# Patient Record
Sex: FEMALE | Race: ASIAN | Employment: OTHER | URBAN - METROPOLITAN AREA
[De-identification: names, ages, dates, MRNs, and addresses within clinical notes are randomized per-mention and may not be internally consistent; named-entity substitution may affect disease eponyms.]

---

## 2017-05-10 ENCOUNTER — OFFICE VISIT (OUTPATIENT)
Dept: FAMILY MEDICINE CLINIC | Age: 62
End: 2017-05-10

## 2017-05-10 ENCOUNTER — HOSPITAL ENCOUNTER (OUTPATIENT)
Dept: GENERAL RADIOLOGY | Age: 62
Discharge: HOME | End: 2017-05-10
Payer: SUBSIDIZED

## 2017-05-10 VITALS
BODY MASS INDEX: 24.88 KG/M2 | SYSTOLIC BLOOD PRESSURE: 145 MMHG | WEIGHT: 133 LBS | DIASTOLIC BLOOD PRESSURE: 95 MMHG | HEART RATE: 71 BPM | TEMPERATURE: 98.8 F

## 2017-05-10 DIAGNOSIS — M25.512 ACUTE PAIN OF LEFT SHOULDER: Primary | ICD-10-CM

## 2017-05-10 DIAGNOSIS — M25.512 ACUTE PAIN OF LEFT SHOULDER: ICD-10-CM

## 2017-05-10 DIAGNOSIS — I10 ESSENTIAL HYPERTENSION: ICD-10-CM

## 2017-05-10 PROCEDURE — 73030 X-RAY EXAM OF SHOULDER: CPT

## 2017-05-10 RX ORDER — LISINOPRIL 5 MG/1
5 TABLET ORAL DAILY
Qty: 90 TAB | Refills: 3 | Status: SHIPPED | OUTPATIENT
Start: 2017-05-10 | End: 2017-12-06 | Stop reason: SDUPTHER

## 2017-05-10 RX ORDER — ISOSORBIDE MONONITRATE 30 MG/1
30 TABLET, EXTENDED RELEASE ORAL DAILY
Qty: 90 TAB | Refills: 1 | Status: SHIPPED | OUTPATIENT
Start: 2017-05-10 | End: 2017-09-13 | Stop reason: SDUPTHER

## 2017-05-10 RX ORDER — METHOCARBAMOL 500 MG/1
500 TABLET, FILM COATED ORAL 3 TIMES DAILY
Qty: 12 TAB | Refills: 0 | Status: SHIPPED | OUTPATIENT
Start: 2017-05-10 | End: 2017-09-13 | Stop reason: SDUPTHER

## 2017-05-10 RX ORDER — DICLOFENAC SODIUM 75 MG/1
75 TABLET, DELAYED RELEASE ORAL 2 TIMES DAILY
Qty: 60 TAB | Refills: 0 | Status: SHIPPED | OUTPATIENT
Start: 2017-05-10 | End: 2017-09-13 | Stop reason: SDUPTHER

## 2017-05-10 NOTE — PROGRESS NOTES
Assessment/Plan:    Indy Newsome was seen today for hypertension and arm pain. Diagnoses and all orders for this visit:    Acute pain of left shoulder  -     diclofenac EC (VOLTAREN) 75 mg EC tablet; Take 1 Tab by mouth two (2) times a day. -     XR SHOULDER LT AP/LAT MIN 2 V; Future  -     methocarbamol (ROBAXIN) 500 mg tablet; Take 1 Tab by mouth three (3) times daily. May take at bedtime for shoulder pain    Essential hypertension  -     isosorbide mononitrate ER (IMDUR) 30 mg tablet; Take 1 Tab by mouth daily. -     lisinopril (PRINIVIL, ZESTRIL) 5 mg tablet; Take 1 Tab by mouth daily. Follow-up Disposition:  Return in about 3 months (around 8/10/2017), or if symptoms worsen or fail to improve. CHAIM De expressed understanding of this plan. An AVS was printed and given to the patient.      ----------------------------------------------------------------------    Chief Complaint   Patient presents with    Hypertension     Medicatin refill    Arm Pain     Left arm pain  X 3 weeks       History of Present Illness:    3 weeks of left shoulder pain. 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 She has not been on any medication for this problem. She has not had any fever or swelling or redness. She reports that it is hard to sleep on her arm due to the pain. She denies radiation of the pain. She has no pain with upper arm reach but pain is reproduced with resisted forward movement. She has not run out of her bp medication. No chest pain or SOB mentioned    Past Medical History:   Diagnosis Date    Essential hypertension        Current Outpatient Prescriptions   Medication Sig Dispense Refill    isosorbide mononitrate ER (IMDUR) 30 mg tablet Take 1 Tab by mouth daily. 90 Tab 1    lisinopril (PRINIVIL, ZESTRIL) 5 mg tablet Take 1 Tab by mouth daily.  90 Tab 3    diclofenac EC (VOLTAREN) 75 mg EC tablet Take 1 Tab by mouth two (2) times a day. 60 Tab 0    methocarbamol (ROBAXIN) 500 mg tablet Take 1 Tab by mouth three (3) times daily. May take at bedtime for shoulder pain 12 Tab 0    raNITIdine (ZANTAC) 150 mg tablet Take 1 Tab by mouth two (2) times a day. 60 Tab 3    guaiFENesin SR (MUCINEX) 600 mg SR tablet Take 1 Tab by mouth two (2) times a day. 60 Tab 1       No Known Allergies    Social History   Substance Use Topics    Smoking status: Former Smoker    Smokeless tobacco: None    Alcohol use No       Family History   Problem Relation Age of Onset    Cancer Sister      breast    Breast Cancer Sister 76       Physical Exam:     Visit Vitals    BP (!) 145/95 (BP 1 Location: Left arm, BP Patient Position: Sitting)    Pulse 71    Temp 98.8 °F (37.1 °C) (Oral)    Wt 133 lb (60.3 kg)    BMI 24.88 kg/m2     gen looks well, pleasant  A&Ox3  WDWN NAD  Respirations normal and non labored  LUE examined: no deformity noted on exam. She has tenderness at lateral scapular border left side and at posterior shoulder joint. She has FROM upward reach. She has no pain with lateral movement. The only movement that reproduces her pain is forward resisted reach.  Hand and elbow have FROM, no swelling, neuro is intact distal to fingertips

## 2017-05-10 NOTE — PATIENT INSTRUCTIONS
Shoulder Pain: Care Instructions  Your Care Instructions    You can hurt your shoulder by using it too much during an activity, such as fishing or baseball. It can also happen as part of the everyday wear and tear of getting older. Shoulder injuries can be slow to heal, but your shoulder should get better with time. Your doctor may recommend a sling to rest your shoulder. If you have injured your shoulder, you may need testing and treatment. Follow-up care is a key part of your treatment and safety. Be sure to make and go to all appointments, and call your doctor if you are having problems. It's also a good idea to know your test results and keep a list of the medicines you take. How can you care for yourself at home? · Take pain medicines exactly as directed. ¨ If the doctor gave you a prescription medicine for pain, take it as prescribed. ¨ If you are not taking a prescription pain medicine, ask your doctor if you can take an over-the-counter medicine. ¨ Do not take two or more pain medicines at the same time unless the doctor told you to. Many pain medicines contain acetaminophen, which is Tylenol. Too much acetaminophen (Tylenol) can be harmful. · If your doctor recommends that you wear a sling, use it as directed. Do not take it off before your doctor tells you to. · Put ice or a cold pack on the sore area for 10 to 20 minutes at a time. Put a thin cloth between the ice and your skin. · If there is no swelling, you can put moist heat, a heating pad, or a warm cloth on your shoulder. Some doctors suggest alternating between hot and cold. · Rest your shoulder for a few days. If your doctor recommends it, you can then begin gentle exercise of the shoulder, but do not lift anything heavy. When should you call for help? Call 911 anytime you think you may need emergency care. For example, call if:  · You have chest pain or pressure. This may occur with:  ¨ Sweating. ¨ Shortness of breath.   ¨ Nausea or vomiting. ¨ Pain that spreads from the chest to the neck, jaw, or one or both shoulders or arms. ¨ Dizziness or lightheadedness. ¨ A fast or uneven pulse. After calling 911, chew 1 adult-strength aspirin. Wait for an ambulance. Do not try to drive yourself. · Your arm or hand is cool or pale or changes color. Call your doctor now or seek immediate medical care if:  · You have signs of infection, such as:  ¨ Increased pain, swelling, warmth, or redness in your shoulder. ¨ Red streaks leading from a place on your shoulder. ¨ Pus draining from an area of your shoulder. ¨ Swollen lymph nodes in your neck, armpits, or groin. ¨ A fever. Watch closely for changes in your health, and be sure to contact your doctor if:  · You cannot use your shoulder. · Your shoulder does not get better as expected. Where can you learn more? Go to http://malachi-dustin.info/. Enter B485 in the search box to learn more about \"Shoulder Pain: Care Instructions. \"  Current as of: May 23, 2016  Content Version: 11.2  © 9231-5773 [employer]. Care instructions adapted under license by [employer] (which disclaims liability or warranty for this information). If you have questions about a medical condition or this instruction, always ask your healthcare professional. [place] [address] any warranty or liability for your use of this information.

## 2017-05-10 NOTE — MR AVS SNAPSHOT
Visit Information Date & Time Provider Department Dept. Phone Encounter #  
 5/10/2017  8:50 AM Luca Gomez [address], [address] [phone] [id number] Follow-up Instructions Return in about 3 months (around 8/10/2017), or if symptoms worsen or fail to improve. Upcoming Health Maintenance Date Due DTaP/Tdap/Td series (1 - Tdap) 10/18/1976 ZOSTER VACCINE AGE 60> 10/18/2015 FOBT Q 1 YEAR AGE 50-75 7/11/2017 INFLUENZA AGE 9 TO ADULT 8/1/2017 PAP AKA CERVICAL CYTOLOGY 2/4/2018 BREAST CANCER SCRN MAMMOGRAM 9/7/2018 Allergies as of 5/10/2017  Review Complete On: 5/10/2017 By: Kunal Sosa No Known Allergies Current Immunizations  Reviewed on 10/22/2014 Name Date Influenza Vaccine (Quad) PF 11/9/2016, 10/22/2014 Not reviewed this visit You Were Diagnosed With   
  
 Codes Comments Acute pain of left shoulder    -  Primary ICD-10-CM: M25.512 ICD-9-CM: 719.41 Essential hypertension     ICD-10-CM: I10 
ICD-9-CM: 401.9 Vitals BP Pulse Temp Weight(growth percentile) BMI OB Status (!) 145/95 (BP 1 Location: Left arm, BP Patient Position: Sitting) 71 98.8 °F (37.1 °C) (Oral) 133 lb (60.3 kg) 24.88 kg/m2 Postmenopausal  
 Smoking Status Former Smoker Vitals History BMI and BSA Data Body Mass Index Body Surface Area  
 24.88 kg/m 2 1.61 m 2 Preferred Pharmacy Pharmacy Name Phone [facility] [address] [place] [address] [phone] Your Updated Medication List  
  
   
This list is accurate as of: 5/10/17  9:14 AM.  Always use your most recent med list.  
  
  
  
  
 diclofenac EC 75 mg EC tablet Commonly known as:  VOLTAREN Take 1 Tab by mouth two (2) times a day. guaiFENesin  mg SR tablet Commonly known as:  [place] [address] Take 1 Tab by mouth two (2) times a day. isosorbide mononitrate ER 30 mg tablet Commonly known as:  IMDUR Take 1 Tab by mouth daily. lisinopril 5 mg tablet Commonly known as:  Minus Salk Take 1 Tab by mouth daily. methocarbamol 500 mg tablet Commonly known as:  ROBAXIN Take 1 Tab by mouth three (3) times daily. May take at bedtime for shoulder pain  
  
 raNITIdine 150 mg tablet Commonly known as:  ZANTAC Take 1 Tab by mouth two (2) times a day. Prescriptions Sent to Pharmacy Refills  
 isosorbide mononitrate ER (IMDUR) 30 mg tablet 1 Sig: Take 1 Tab by mouth daily. Class: Normal  
 Pharmacy: [address] Ph #: [phone] Route: Oral  
 lisinopril (PRINIVIL, ZESTRIL) 5 mg tablet 3 Sig: Take 1 Tab by mouth daily. Class: Normal  
 Pharmacy: [address] Ph #: [phone] Route: Oral  
 diclofenac EC (VOLTAREN) 75 mg EC tablet 0 Sig: Take 1 Tab by mouth two (2) times a day. Class: Normal  
 Pharmacy: [address] Ph #: [phone] Route: Oral  
 methocarbamol (ROBAXIN) 500 mg tablet 0 Sig: Take 1 Tab by mouth three (3) times daily. May take at bedtime for shoulder pain  
 Class: Normal  
 Pharmacy: [address] Ph #: [phone] Route: Oral  
  
Follow-up Instructions Return in about 3 months (around 8/10/2017), or if symptoms worsen or fail to improve. To-Do List   
 05/10/2017 Imaging:  XR SHOULDER LT AP/LAT MIN 2 V Patient Instructions Shoulder Pain: Care Instructions Your Care Instructions You can hurt your shoulder by using it too much during an activity, such as fishing or baseball. It can also happen as part of the everyday wear and tear of getting older. Shoulder injuries can be slow to heal, but your shoulder should get better with time. Your doctor may recommend a sling to rest your shoulder. If you have injured your shoulder, you may need testing and treatment. Follow-up care is a key part of your treatment and safety. Be sure to make and go to all appointments, and call your doctor if you are having problems. It's also a good idea to know your test results and keep a list of the medicines you take. How can you care for yourself at home? · Take pain medicines exactly as directed. ¨ If the doctor gave you a prescription medicine for pain, take it as prescribed. ¨ If you are not taking a prescription pain medicine, ask your doctor if you can take an over-the-counter medicine. ¨ Do not take two or more pain medicines at the same time unless the doctor told you to. Many pain medicines contain acetaminophen, which is Tylenol. Too much acetaminophen (Tylenol) can be harmful. · If your doctor recommends that you wear a sling, use it as directed. Do not take it off before your doctor tells you to. · Put ice or a cold pack on the sore area for 10 to 20 minutes at a time. Put a thin cloth between the ice and your skin. · If there is no swelling, you can put moist heat, a heating pad, or a warm cloth on your shoulder. Some doctors suggest alternating between hot and cold. · Rest your shoulder for a few days. If your doctor recommends it, you can then begin gentle exercise of the shoulder, but do not lift anything heavy. When should you call for help? Call 911 anytime you think you may need emergency care. For example, call if: 
· You have chest pain or pressure. This may occur with: ¨ Sweating. ¨ Shortness of breath. ¨ Nausea or vomiting. ¨ Pain that spreads from the chest to the neck, jaw, or one or both shoulders or arms. ¨ Dizziness or lightheadedness. ¨ A fast or uneven pulse. After calling 911, chew 1 adult-strength aspirin. Wait for an ambulance. Do not try to drive yourself. · Your arm or hand is cool or pale or changes color. Call your doctor now or seek immediate medical care if: 
· You have signs of infection, such as: 
¨ Increased pain, swelling, warmth, or redness in your shoulder. ¨ Red streaks leading from a place on your shoulder. ¨ Pus draining from an area of your shoulder. ¨ Swollen lymph nodes in your neck, armpits, or groin. ¨ A fever. Watch closely for changes in your health, and be sure to contact your doctor if: 
· You cannot use your shoulder. · Your shoulder does not get better as expected. Where can you learn more? Go to http://malachi-dustin.info/. Enter C530 in the search box to learn more about \"Shoulder Pain: Care Instructions. \" Current as of: May 23, 2016 Content Version: 11.2 © 8821-3297 [employer]. Care instructions adapted under license by [employer] (which disclaims liability or warranty for this information). If you have questions about a medical condition or this instruction, always ask your healthcare professional. [place] [address] any warranty or liability for your use of this information. Introducing \[facility]\"" & HEALTH SERVICES! Dear Sushma Go: Thank you for requesting a Pin-[employer] account. Our records indicate that you already have an active Pin-Digital account. You can access your account anytime at [email or web address] Did you know that you can access your hospital and ER discharge instructions at any time in Pin-Digital? You can also review all of your test results from your hospital stay or ER visit. Additional Information If you have questions, please visit the Frequently Asked Questions section of the Pin-Digital website at [email or web address] Remember, Pin-Digital is NOT to be used for urgent needs. For medical emergencies, dial 911. Now available from your iPhone and Android! Please provide this summary of care documentation to your next provider. Your primary care clinician is listed as [facility]. If you have any questions after today's visit, please call [phone].

## 2017-09-06 ENCOUNTER — HOSPITAL ENCOUNTER (OUTPATIENT)
Dept: MAMMOGRAPHY | Age: 62
Discharge: HOME | End: 2017-09-06

## 2017-09-06 ENCOUNTER — OFFICE VISIT (OUTPATIENT)
Dept: FAMILY PLANNING/WOMEN'S HEALTH CLINIC | Age: 62
End: 2017-09-06

## 2017-09-06 VITALS — DIASTOLIC BLOOD PRESSURE: 82 MMHG | HEART RATE: 78 BPM | SYSTOLIC BLOOD PRESSURE: 120 MMHG

## 2017-09-06 DIAGNOSIS — Z12.31 VISIT FOR SCREENING MAMMOGRAM: ICD-10-CM

## 2017-09-06 DIAGNOSIS — Z01.419 ENCOUNTER FOR WELL WOMAN EXAM: Primary | ICD-10-CM

## 2017-09-06 PROCEDURE — 77067 SCR MAMMO BI INCL CAD: CPT

## 2017-09-06 NOTE — PROGRESS NOTES
EVERY WOMANS LIFE HISTORY QUESTIONNAIRE       No Yes Comments   Has a doctor ever seen or felt anything wrong with your breast? [x]                                  []                                     Have you ever had a breast biopsy? [x]                                  []                                          When and where was last mammogram performed? 9/2016 [facility]    Have you ever been told that there was a problem on your mammogram?   No Yes Comments   [x]                                  []                                       Do you have breast implants? No Yes Comments   [x]                                  []                                       When was your last Pap test performed? 2/2015 [facility]    Have you ever had an abnormal Pap test?   No Yes Comments   [x]                                  []                                       Have you had a hysterectomy? No Yes Comments (why)   [x]                                  []                                       Have you ever been diagnosed with any type of Cancer   No Yes Comments (type,when,where,type of treatment   [x]                                  []                                          Has a family member been diagnosed with breast or ovarian cancer? No Yes Comments (which family members, and type   []                                  [x]                                  Sister diagnosed with breast cancer at 79, still living      Did your mother take CLAUDIA? No Yes Unknown   [x]                                  []                                       Do you have a history of HIV exposure? No Yes    [x]                                  []                                         Have you been through menopause?    No Yes Date of LMP   []                                  [x]                                  2003     Are you taking hormone replacement therapy (HRT)     No Yes Comments   []                                  [x] Took for 5 years, no longer on     How many times have you been pregnant? 7          Number of live births ? 6    Are you experiencing any of the following? No Yes Comments   Nipple Discharge [x]                                  []                                     Breast Lump/Masses [x]                                  []                                     Breast Skin Changes [x]                                  []                                          No Yes Comments   Vaginal Discharge [x]                                  []                                     Abnormal/unusual vaginal bleeding [x]                                  []                                         Are you experiencing any other health problems?  Blood pressure, joint problems

## 2017-09-06 NOTE — PROGRESS NOTES
Assessment/Plan:    Diagnoses and all orders for this visit:    1. Encounter for well woman exam    Has f/up  for shoulder pain. Follow-up Disposition:  Return in about 1 year (around 2018). CHAIM Strickland expressed understanding of this plan. An AVS was printed and given to the patient.      ----------------------------------------------------------------------    Chief Complaint   Patient presents with    Well Woman     EWL       History of Present Illness:  63 yo  menopause     21 years ago, not sexually active  Had hot flashes initially after menopause and doctor in [facility] rx'd HRT for 5 years, since then no HRT and no hot flashes  No hx of abnl pap or mammo  She had a colonoscopy  which was negative with a 10 year f/up recommended- see note please      Past Medical History:   Diagnosis Date    Essential hypertension        Current Outpatient Prescriptions   Medication Sig Dispense Refill    isosorbide mononitrate ER (IMDUR) 30 mg tablet Take 1 Tab by mouth daily. 90 Tab 1    lisinopril (PRINIVIL, ZESTRIL) 5 mg tablet Take 1 Tab by mouth daily. 90 Tab 3    diclofenac EC (VOLTAREN) 75 mg EC tablet Take 1 Tab by mouth two (2) times a day. 60 Tab 0    methocarbamol (ROBAXIN) 500 mg tablet Take 1 Tab by mouth three (3) times daily. May take at bedtime for shoulder pain 12 Tab 0    raNITIdine (ZANTAC) 150 mg tablet Take 1 Tab by mouth two (2) times a day. 60 Tab 3    guaiFENesin SR (MUCINEX) 600 mg SR tablet Take 1 Tab by mouth two (2) times a day.  60 Tab 1       No Known Allergies    Social History   Substance Use Topics    Smoking status: Former Smoker    Smokeless tobacco: None    Alcohol use No       Family History   Problem Relation Age of Onset    Cancer Sister      breast    Breast Cancer Sister 76       Physical Exam:     Visit Vitals    /82 (BP 1 Location: Left arm, BP Patient Position: Sitting)    Pulse 78 A&Ox3  WDWN NAD  Respirations normal and non labored  Breast exam- neg doris for masses, dimpling, retractions, skin color changes or nipple changes  Pelvic exam- ext neg for lesions or masses.  Severe atrophy/vaginal dryness makes for uncomfortable exam. Cervix is posterior and was able to feel it with bimanual exam but did not visualize it  Uterus and adnexal exam show no mass or tenderness  Rectal exam- no masses felt, heme neg stool guaiac

## 2017-09-13 ENCOUNTER — OFFICE VISIT (OUTPATIENT)
Dept: FAMILY MEDICINE CLINIC | Age: 62
End: 2017-09-13

## 2017-09-13 VITALS
SYSTOLIC BLOOD PRESSURE: 136 MMHG | TEMPERATURE: 97.6 F | DIASTOLIC BLOOD PRESSURE: 93 MMHG | BODY MASS INDEX: 24.84 KG/M2 | HEIGHT: 61 IN | HEART RATE: 77 BPM | WEIGHT: 131.6 LBS

## 2017-09-13 DIAGNOSIS — I10 ESSENTIAL HYPERTENSION: ICD-10-CM

## 2017-09-13 DIAGNOSIS — G89.29 CHRONIC LEFT SHOULDER PAIN: Primary | ICD-10-CM

## 2017-09-13 DIAGNOSIS — M25.512 CHRONIC LEFT SHOULDER PAIN: Primary | ICD-10-CM

## 2017-09-13 DIAGNOSIS — M25.512 ACUTE PAIN OF LEFT SHOULDER: ICD-10-CM

## 2017-09-13 RX ORDER — ISOSORBIDE MONONITRATE 30 MG/1
30 TABLET, EXTENDED RELEASE ORAL DAILY
Qty: 90 TAB | Refills: 1 | Status: SHIPPED | OUTPATIENT
Start: 2017-09-13 | End: 2017-12-06 | Stop reason: SDUPTHER

## 2017-09-13 RX ORDER — DICLOFENAC SODIUM 75 MG/1
75 TABLET, DELAYED RELEASE ORAL 2 TIMES DAILY
Qty: 60 TAB | Refills: 0 | Status: SHIPPED | OUTPATIENT
Start: 2017-09-13 | End: 2017-11-06

## 2017-09-13 RX ORDER — METHOCARBAMOL 500 MG/1
500 TABLET, FILM COATED ORAL
Qty: 30 TAB | Refills: 0 | Status: SHIPPED | OUTPATIENT
Start: 2017-09-13 | End: 2018-03-05

## 2017-09-13 NOTE — PROGRESS NOTES
Coordination of Care  1. Have you been to the ER, urgent care clinic since your last visit? Hospitalized since your last visit? No    2. Have you seen or consulted any other health care providers outside of the [address] [place] [address] since your last visit? Include any pap smears or colon screening. No    Medications  Medication Reconciliation Performed: yes  Patient does not need refills     Learning Assessment Complete?  yes

## 2017-09-13 NOTE — PROGRESS NOTES
Subjective:     Chief Complaint   Patient presents with    Shoulder Pain     Pt c/o L shoulder pain, wants x-ray results        She  is a 64 y.o. female who presents for evaluation of chronic L shoulder pain. Pt's dtr is present and providing interpretation. Onset approx 6 months ago w/o any acute injury/accident. Notes prior remedies and Tx took her pain from 8 to a 6. Pain is constant, worse with activity. Pt also notes weakness in L arm and shoulder. No other attempted remedies. ROS  Gen - no fever/chills  Resp - no dyspnea or cough  CV - no chest pain or BARGER  Rest per HPI    Past Medical History:   Diagnosis Date    Essential hypertension      No past surgical history on file. Current Outpatient Prescriptions on File Prior to Visit   Medication Sig Dispense Refill    isosorbide mononitrate ER (IMDUR) 30 mg tablet Take 1 Tab by mouth daily. 90 Tab 1    lisinopril (PRINIVIL, ZESTRIL) 5 mg tablet Take 1 Tab by mouth daily. 90 Tab 3    diclofenac EC (VOLTAREN) 75 mg EC tablet Take 1 Tab by mouth two (2) times a day. 60 Tab 0    methocarbamol (ROBAXIN) 500 mg tablet Take 1 Tab by mouth three (3) times daily. May take at bedtime for shoulder pain 12 Tab 0    raNITIdine (ZANTAC) 150 mg tablet Take 1 Tab by mouth two (2) times a day. 60 Tab 3    guaiFENesin SR (MUCINEX) 600 mg SR tablet Take 1 Tab by mouth two (2) times a day. 60 Tab 1     No current facility-administered medications on file prior to visit.          Objective:     Vitals:    09/13/17 1047   BP: (!) 136/93   Pulse: 77   Temp: 97.6 °F (36.4 °C)   TempSrc: Oral   Weight: 131 lb 9.6 oz (59.7 kg)   Height: 5' 1\" (1.549 m)       Physical Examination:  General appearance - alert, well appearing, and in no distress  Eyes -sclera anicteric  Neck - supple, no significant adenopathy, no thyromegaly  Chest - clear to auscultation, no wheezes, rales or rhonchi, symmetric air entry  Heart - normal rate, regular rhythm, normal S1, S2, no murmurs, rubs, clicks or gallops  Neurological - alert, oriented, no focal findings or movement disorder noted    L shoulder: posterior tenderness noted, ROM, + palpable crepituslimited       Assessment/ Plan:   Diagnoses and all orders for this visit:    1. Chronic left shoulder pain  -     REFERRAL TO ORTHOPEDICS    2. Acute pain of left shoulder  -     REFERRAL TO ORTHOPEDICS  -     methocarbamol (ROBAXIN) 500 mg tablet; Take 1 Tab by mouth nightly as needed. May take at bedtime for shoulder pain  -     diclofenac EC (VOLTAREN) 75 mg EC tablet; Take 1 Tab by mouth two (2) times a day. 3. Essential hypertension  -     isosorbide mononitrate ER (IMDUR) 30 mg tablet; Take 1 Tab by mouth daily. Given prolonged pain and lack of response to 1st/2nd line remedies and normal x-ray, will refer Pt to ortho via AN for eval.   Pt/dtr note Pt was approved last year. Cont PRN diclofenac and PRN Robaxin. Refill Imdur for HTN/CV disease. RTC PRN. I have discussed the diagnosis with the patient and the intended plan as seen in the above orders. The patient has received an after-visit summary and questions were answered concerning future plans. I have discussed medication side effects and warnings with the patient as well. The patient verbalizes understanding and agreement with the plan. Follow-up Disposition:  Return if symptoms worsen or fail to improve.

## 2017-09-13 NOTE — MR AVS SNAPSHOT
Visit Information Date & Time Provider Department Dept. Phone Encounter #  
 9/13/2017 10:50 AM Aric Hale [facility] [phone] [id number] Follow-up Instructions Return if symptoms worsen or fail to improve. Upcoming Health Maintenance Date Due DTaP/Tdap/Td series (1 - Tdap) 10/18/1976 ZOSTER VACCINE AGE 60> 8/18/2015 FOBT Q 1 YEAR AGE 50-75 7/11/2017 INFLUENZA AGE 9 TO ADULT 8/1/2017 PAP AKA CERVICAL CYTOLOGY 2/4/2018 BREAST CANCER SCRN MAMMOGRAM 9/6/2019 Allergies as of 9/13/2017  Review Complete On: 9/13/2017 By: Princess Chris No Known Allergies Current Immunizations  Reviewed on 10/22/2014 Name Date Influenza Vaccine (Quad) PF 11/9/2016, 10/22/2014 Not reviewed this visit You Were Diagnosed With   
  
 Codes Comments Chronic left shoulder pain    -  Primary ICD-10-CM: M25.512, X86.43 ICD-9-CM: 719.41, 338.29 Acute pain of left shoulder     ICD-10-CM: M25.512 ICD-9-CM: 719.41 Essential hypertension     ICD-10-CM: I10 
ICD-9-CM: 401.9 Vitals BP Pulse Temp Height(growth percentile) Weight(growth percentile) BMI  
 (!) 136/93 (BP 1 Location: Left arm, BP Patient Position: Sitting) 77 97.6 °F (36.4 °C) (Oral) 5' 1\" (1.549 m) 131 lb 9.6 oz (59.7 kg) 24.87 kg/m2 OB Status Smoking Status Postmenopausal Former Smoker Vitals History BMI and BSA Data Body Mass Index Body Surface Area  
 24.87 kg/m 2 1.6 m 2 Preferred Pharmacy Pharmacy Name Phone [facility] [address] [phone] Your Updated Medication List  
  
   
This list is accurate as of: 9/13/17 11:16 AM.  Always use your most recent med list.  
  
  
  
  
 diclofenac EC 75 mg EC tablet Commonly known as:  VOLTAREN Take 1 Tab by mouth two (2) times a day. guaiFENesin  mg SR tablet Commonly known as:  Edward CryptoSeal Edward Take 1 Tab by mouth two (2) times a day. isosorbide mononitrate ER 30 mg tablet Commonly known as:  IMDUR Take 1 Tab by mouth daily. lisinopril 5 mg tablet Commonly known as:  Mechele Livings Take 1 Tab by mouth daily. methocarbamol 500 mg tablet Commonly known as:  ROBAXIN Take 1 Tab by mouth nightly as needed. May take at bedtime for shoulder pain  
  
 raNITIdine 150 mg tablet Commonly known as:  ZANTAC Take 1 Tab by mouth two (2) times a day. Prescriptions Sent to Pharmacy Refills  
 methocarbamol (ROBAXIN) 500 mg tablet 0 Sig: Take 1 Tab by mouth nightly as needed. May take at bedtime for shoulder pain  
 Class: Normal  
 Pharmacy: [facility] [address] [place] [address] Ph #: [phone] Route: Oral  
 diclofenac EC (VOLTAREN) 75 mg EC tablet 0 Sig: Take 1 Tab by mouth two (2) times a day. Class: Normal  
 Pharmacy: [facility] [address] [place] [address] Ph #: [phone] Route: Oral  
 isosorbide mononitrate ER (IMDUR) 30 mg tablet 1 Sig: Take 1 Tab by mouth daily. Class: Normal  
 Pharmacy: [facility] [address] Ph #: [phone] Route: Oral  
  
We Performed the Following REFERRAL TO ORTHOPEDICS [XSI398 Custom] Comments:  
 Access Now Referral Request Form: 
 
Has the patient live in the area for 6 months Yes Is the patient here on a visa No 
 
Priority: 
 
4 weeks Location: [facility] Patient Demographics: 
 
Date:  9/13/2017                          EMR# [id number] Gerre Milder 1955 Home Phone : [phone]             Cell Phone:   
 
Language :  Aryan Luna Specialist Requested:  Ortho/shoulder Reason for Request:  Chronic L shoulder pain x 5-6 months, did not respond to 1st/2nd line Tx Specialist Requirements: 
 
If GYN Referral:   
Pap: n/a 
 
 If Ortho Referral: MRI no      
XRAY: yes Pulmonary Referrals must have :  
C-X-ray no OR  
CT Scan no Referring Provider:  Janay Faith NP Follow-up Instructions Return if symptoms worsen or fail to improve. Referral Information Referral ID Referred By Referred To  
  
 [id number] Deidre Reyna. Not Available Visits Status Start Date End Date 1 New Request 9/13/17 9/13/18 If your referral has a status of pending review or denied, additional information will be sent to support the outcome of this decision. Patient Instructions Shoulder Pain: Care Instructions Your Care Instructions You can hurt your shoulder by using it too much during an activity, such as fishing or baseball. It can also happen as part of the everyday wear and tear of getting older. Shoulder injuries can be slow to heal, but your shoulder should get better with time. Your doctor may recommend a sling to rest your shoulder. If you have injured your shoulder, you may need testing and treatment. Follow-up care is a key part of your treatment and safety. Be sure to make and go to all appointments, and call your doctor if you are having problems. It's also a good idea to know your test results and keep a list of the medicines you take. How can you care for yourself at home? · Take pain medicines exactly as directed. ¨ If the doctor gave you a prescription medicine for pain, take it as prescribed. ¨ If you are not taking a prescription pain medicine, ask your doctor if you can take an over-the-counter medicine. ¨ Do not take two or more pain medicines at the same time unless the doctor told you to. Many pain medicines contain acetaminophen, which is Tylenol. Too much acetaminophen (Tylenol) can be harmful. · If your doctor recommends that you wear a sling, use it as directed. Do not take it off before your doctor tells you to. · Put ice or a cold pack on the sore area for 10 to 20 minutes at a time. Put a thin cloth between the ice and your skin. · If there is no swelling, you can put moist heat, a heating pad, or a warm cloth on your shoulder. Some doctors suggest alternating between hot and cold. · Rest your shoulder for a few days. If your doctor recommends it, you can then begin gentle exercise of the shoulder, but do not lift anything heavy. When should you call for help? Call 911 anytime you think you may need emergency care. For example, call if: 
· You have chest pain or pressure. This may occur with: ¨ Sweating. ¨ Shortness of breath. ¨ Nausea or vomiting. ¨ Pain that spreads from the chest to the neck, jaw, or one or both shoulders or arms. ¨ Dizziness or lightheadedness. ¨ A fast or uneven pulse. After calling 911, chew 1 adult-strength aspirin. Wait for an ambulance. Do not try to drive yourself. · Your arm or hand is cool or pale or changes color. Call your doctor now or seek immediate medical care if: 
· You have signs of infection, such as: 
¨ Increased pain, swelling, warmth, or redness in your shoulder. ¨ Red streaks leading from a place on your shoulder. ¨ Pus draining from an area of your shoulder. ¨ Swollen lymph nodes in your neck, armpits, or groin. ¨ A fever. Watch closely for changes in your health, and be sure to contact your doctor if: 
· You cannot use your shoulder. · Your shoulder does not get better as expected. Where can you learn more? Go to http://malachi-dustin.info/. Enter E092 in the search box to learn more about \"Shoulder Pain: Care Instructions. \" Current as of: March 21, 2017 Content Version: 11.3 © 2415-7906 TellApart. Care instructions adapted under license by [employer] (which disclaims liability or warranty for this information).  If you have questions about a medical condition or this instruction, always ask your healthcare professional. [address] any warranty or liability for your use of this information. Introducing [facility]! Dear Sd Pennington: Thank you for requesting a Bandcamp account. Our records indicate that you already have an active Bandcamp account. You can access your account anytime at [email or web address] Did you know that you can access your hospital and ER discharge instructions at any time in Bandcamp? You can also review all of your test results from your hospital stay or ER visit. Additional Information If you have questions, please visit the Frequently Asked Questions section of the Bandcamp website at [email or web address] Remember, Bandcamp is NOT to be used for urgent needs. For medical emergencies, dial 911. Now available from your iPhone and Android! Please provide this summary of care documentation to your next provider. Your primary care clinician is listed as Timmy Goldstein. If you have any questions after today's visit, please call [phone].

## 2017-09-13 NOTE — PATIENT INSTRUCTIONS
Shoulder Pain: Care Instructions  Your Care Instructions    You can hurt your shoulder by using it too much during an activity, such as fishing or baseball. It can also happen as part of the everyday wear and tear of getting older. Shoulder injuries can be slow to heal, but your shoulder should get better with time. Your doctor may recommend a sling to rest your shoulder. If you have injured your shoulder, you may need testing and treatment. Follow-up care is a key part of your treatment and safety. Be sure to make and go to all appointments, and call your doctor if you are having problems. It's also a good idea to know your test results and keep a list of the medicines you take. How can you care for yourself at home? · Take pain medicines exactly as directed. ¨ If the doctor gave you a prescription medicine for pain, take it as prescribed. ¨ If you are not taking a prescription pain medicine, ask your doctor if you can take an over-the-counter medicine. ¨ Do not take two or more pain medicines at the same time unless the doctor told you to. Many pain medicines contain acetaminophen, which is Tylenol. Too much acetaminophen (Tylenol) can be harmful. · If your doctor recommends that you wear a sling, use it as directed. Do not take it off before your doctor tells you to. · Put ice or a cold pack on the sore area for 10 to 20 minutes at a time. Put a thin cloth between the ice and your skin. · If there is no swelling, you can put moist heat, a heating pad, or a warm cloth on your shoulder. Some doctors suggest alternating between hot and cold. · Rest your shoulder for a few days. If your doctor recommends it, you can then begin gentle exercise of the shoulder, but do not lift anything heavy. When should you call for help? Call 911 anytime you think you may need emergency care. For example, call if:  · You have chest pain or pressure. This may occur with:  ¨ Sweating. ¨ Shortness of breath.   ¨ Nausea or vomiting. ¨ Pain that spreads from the chest to the neck, jaw, or one or both shoulders or arms. ¨ Dizziness or lightheadedness. ¨ A fast or uneven pulse. After calling 911, chew 1 adult-strength aspirin. Wait for an ambulance. Do not try to drive yourself. · Your arm or hand is cool or pale or changes color. Call your doctor now or seek immediate medical care if:  · You have signs of infection, such as:  ¨ Increased pain, swelling, warmth, or redness in your shoulder. ¨ Red streaks leading from a place on your shoulder. ¨ Pus draining from an area of your shoulder. ¨ Swollen lymph nodes in your neck, armpits, or groin. ¨ A fever. Watch closely for changes in your health, and be sure to contact your doctor if:  · You cannot use your shoulder. · Your shoulder does not get better as expected. Where can you learn more? Go to http://malachi-dustin.info/. Enter S906 in the search box to learn more about \"Shoulder Pain: Care Instructions. \"  Current as of: March 21, 2017  Content Version: 11.3  © 6336-3117 Roller. Care instructions adapted under license by [employer] (which disclaims liability or warranty for this information). If you have questions about a medical condition or this instruction, always ask your healthcare professional. [place] [address] any warranty or liability for your use of this information.

## 2017-09-14 ENCOUNTER — TELEPHONE (OUTPATIENT)
Dept: FAMILY MEDICINE CLINIC | Age: 62
End: 2017-09-14

## 2017-09-14 NOTE — TELEPHONE ENCOUNTER
Spoke to [facility] and scheduled AN screening appointment on 9/19/17 @ 1:30 pm.    Sadia Hernandez

## 2017-09-19 ENCOUNTER — OFFICE VISIT (OUTPATIENT)
Dept: FAMILY MEDICINE CLINIC | Age: 62
End: 2017-09-19

## 2017-09-19 DIAGNOSIS — Z71.89 COUNSELING AND COORDINATION OF CARE: Primary | ICD-10-CM

## 2017-11-06 ENCOUNTER — HOSPITAL ENCOUNTER (OUTPATIENT)
Dept: LAB | Age: 62
Discharge: HOME | End: 2017-11-06

## 2017-11-06 ENCOUNTER — OFFICE VISIT (OUTPATIENT)
Dept: FAMILY MEDICINE CLINIC | Age: 62
End: 2017-11-06

## 2017-11-06 VITALS
SYSTOLIC BLOOD PRESSURE: 133 MMHG | BODY MASS INDEX: 24.75 KG/M2 | HEART RATE: 69 BPM | TEMPERATURE: 98.5 F | DIASTOLIC BLOOD PRESSURE: 95 MMHG | WEIGHT: 131 LBS

## 2017-11-06 DIAGNOSIS — Z23 ENCOUNTER FOR IMMUNIZATION: ICD-10-CM

## 2017-11-06 DIAGNOSIS — M25.562 CHRONIC PAIN OF LEFT KNEE: ICD-10-CM

## 2017-11-06 DIAGNOSIS — G89.29 CHRONIC PAIN OF LEFT KNEE: ICD-10-CM

## 2017-11-06 DIAGNOSIS — G89.29 CHRONIC PAIN OF LEFT KNEE: Primary | ICD-10-CM

## 2017-11-06 DIAGNOSIS — I10 ESSENTIAL HYPERTENSION: ICD-10-CM

## 2017-11-06 DIAGNOSIS — M25.562 CHRONIC PAIN OF LEFT KNEE: Primary | ICD-10-CM

## 2017-11-06 LAB — RHEUMATOID FACT SERPL-ACNC: <10 IU/ML

## 2017-11-06 PROCEDURE — 86431 RHEUMATOID FACTOR QUANT: CPT | Performed by: NURSE PRACTITIONER

## 2017-11-06 RX ORDER — NAPROXEN 500 MG/1
500 TABLET ORAL 2 TIMES DAILY WITH MEALS
Qty: 60 TAB | Refills: 1 | Status: SHIPPED | OUTPATIENT
Start: 2017-11-06 | End: 2018-03-05

## 2017-11-06 NOTE — MR AVS SNAPSHOT
Visit Information Date & Time Provider Department Dept. Phone Encounter #  
 11/6/2017  1:55 PM Alison Encarnacion, [address] [place] [address] Avenue [phone] [id number] Upcoming Health Maintenance Date Due DTaP/Tdap/Td series (1 - Tdap) 10/18/1976 ZOSTER VACCINE AGE 60> 8/18/2015 FOBT Q 1 YEAR AGE 50-75 7/11/2017 INFLUENZA AGE 9 TO ADULT 8/1/2017 PAP AKA CERVICAL CYTOLOGY 2/4/2018 BREAST CANCER SCRN MAMMOGRAM 9/6/2019 Allergies as of 11/6/2017  Review Complete On: 11/6/2017 By: Alison Encarnacion NP No Known Allergies Current Immunizations  Reviewed on 10/22/2014 Name Date Influenza Vaccine (Quad) PF 11/9/2016, 10/22/2014 Not reviewed this visit You Were Diagnosed With   
  
 Codes Comments Chronic pain of left knee    -  Primary ICD-10-CM: M25.562, B21.65 ICD-9-CM: 719.46, 338.29 Essential hypertension     ICD-10-CM: I10 
ICD-9-CM: 401.9 Vitals BP Pulse Temp Weight(growth percentile) BMI OB Status (!) 133/95 (BP 1 Location: Right arm, BP Patient Position: Sitting) 69 98.5 °F (36.9 °C) (Oral) 131 lb (59.4 kg) 24.75 kg/m2 Postmenopausal  
 Smoking Status Former Smoker Vitals History BMI and BSA Data Body Mass Index Body Surface Area 24.75 kg/m 2 1.6 m 2 Preferred Pharmacy Pharmacy Name Phone [facility] [address] [place] [address] [phone] Your Updated Medication List  
  
   
This list is accurate as of: 11/6/17  3:15 PM.  Always use your most recent med list.  
  
  
  
  
 guaiFENesin  mg SR tablet Commonly known as:  Edward & Edward Take 1 Tab by mouth two (2) times a day. isosorbide mononitrate ER 30 mg tablet Commonly known as:  IMDUR Take 1 Tab by mouth daily. lisinopril 5 mg tablet Commonly known as:  Gabriella Shames Take 1 Tab by mouth daily. methocarbamol 500 mg tablet Commonly known as:  ROBAXIN  
 Take 1 Tab by mouth nightly as needed. May take at bedtime for shoulder pain  
  
 naproxen 500 mg tablet Commonly known as:  NAPROSYN Take 1 Tab by mouth two (2) times daily (with meals). raNITIdine 150 mg tablet Commonly known as:  ZANTAC Take 1 Tab by mouth two (2) times a day. Prescriptions Sent to Pharmacy Refills  
 naproxen (NAPROSYN) 500 mg tablet 1 Sig: Take 1 Tab by mouth two (2) times daily (with meals). Class: Normal  
 Pharmacy: [address] #: [phone] Route: Oral  
  
We Performed the Following REFERRAL TO ORTHOPEDIC SURGERY [REF62 Custom] Comments:  
 Access Now Referral Request Form: 
Has the patient lived in the area for 6 months Yes Is the patient here on a visa No 
Priority: 
4 weeks Location: [facility] Patient Demographics: 
Date:  11/6/2017                          EMR# [id number] Ana Maria Pooleser 1955 Home Phone : [phone]             Cell Phone:   
Language :  [place] Specialist Requested:  Orthopedic Surgery Reason for Request:  Left knee pain Specialist Requirements: 
If GYN Referral:   
Pap: n/a If Ortho Referral: MRI n/a      
XRAY: yes Pulmonary Referrals must have :  
C-X-ray n/a OR  
CT Scan n/a Referring Provider:  Gigi Moise NP Discussed with patient that a referral from Access Now requires PCP to be [facility] or the [address] [place] [address]. Patient stated understanding. To-Do List   
 11/06/2017 Lab:  RHEUMATOID FACTOR, QL   
  
 11/06/2017 Imaging:  XR KNEE LT MAX 2 VWS Referral Information Referral ID Referred By Referred To  
  
 [id number] Farzana PASTRANA Not Available Visits Status Start Date End Date 1 New Request 11/6/17 11/6/18 If your referral has a status of pending review or denied, additional information will be sent to support the outcome of this decision. Introducing [facility]! Dear Kristine Philip: Thank you for requesting a Shotlst account. Our records indicate that you already have an active Shotlst account. You can access your account anytime at [email or web address] Did you know that you can access your hospital and ER discharge instructions at any time in Shotlst? You can also review all of your test results from your hospital stay or ER visit. Additional Information If you have questions, please visit the Frequently Asked Questions section of the Shotlst website at [email or web address] Remember, Shotlst is NOT to be used for urgent needs. For medical emergencies, dial 911. Now available from your iPhone and Android! Please provide this summary of care documentation to your next provider. Your primary care clinician is listed as Maren Goldstein. If you have any questions after today's visit, please call [phone].
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
## 2017-11-06 NOTE — PROGRESS NOTES
Coordination of Care  1. Have you been to the ER, urgent care clinic since your last visit? Hospitalized since your last visit? No    2. Have you seen or consulted any other health care providers outside of the [address] [place] [address] since your last visit? Include any pap smears or colon screening. No    Medications  Does the patient need refills? YES    Learning Assessment Complete?  yes

## 2017-11-06 NOTE — PROGRESS NOTES
Gave vaccine per protocol. Documented in [address]. Gave patient VIS information sheet and reviewed instructions regarding possible adverse side effects and allergic reactions. No adverse reaction noted at time of discharge.  Luigi Cabrera RN

## 2017-11-13 ENCOUNTER — HOSPITAL ENCOUNTER (OUTPATIENT)
Dept: GENERAL RADIOLOGY | Age: 62
Discharge: HOME | End: 2017-11-13
Payer: SUBSIDIZED

## 2017-11-13 DIAGNOSIS — M25.562 CHRONIC PAIN OF LEFT KNEE: ICD-10-CM

## 2017-11-13 DIAGNOSIS — G89.29 CHRONIC PAIN OF LEFT KNEE: ICD-10-CM

## 2017-11-13 PROCEDURE — 73562 X-RAY EXAM OF KNEE 3: CPT

## 2017-12-06 ENCOUNTER — HOSPITAL ENCOUNTER (OUTPATIENT)
Dept: GENERAL RADIOLOGY | Age: 62
Discharge: HOME | End: 2017-12-06
Payer: SUBSIDIZED

## 2017-12-06 ENCOUNTER — OFFICE VISIT (OUTPATIENT)
Dept: FAMILY MEDICINE CLINIC | Age: 62
End: 2017-12-06

## 2017-12-06 VITALS
WEIGHT: 135 LBS | HEART RATE: 82 BPM | SYSTOLIC BLOOD PRESSURE: 128 MMHG | TEMPERATURE: 98.4 F | DIASTOLIC BLOOD PRESSURE: 90 MMHG | BODY MASS INDEX: 25.51 KG/M2

## 2017-12-06 DIAGNOSIS — M25.561 RIGHT MEDIAL KNEE PAIN: ICD-10-CM

## 2017-12-06 DIAGNOSIS — I10 ESSENTIAL HYPERTENSION: ICD-10-CM

## 2017-12-06 DIAGNOSIS — M25.562 CHRONIC PAIN OF LEFT KNEE: Primary | ICD-10-CM

## 2017-12-06 DIAGNOSIS — M25.569 KNEE PAIN, UNSPECIFIED CHRONICITY, UNSPECIFIED LATERALITY: ICD-10-CM

## 2017-12-06 DIAGNOSIS — G89.29 CHRONIC PAIN OF LEFT KNEE: Primary | ICD-10-CM

## 2017-12-06 PROCEDURE — 73562 X-RAY EXAM OF KNEE 3: CPT

## 2017-12-06 RX ORDER — ISOSORBIDE MONONITRATE 30 MG/1
30 TABLET, EXTENDED RELEASE ORAL DAILY
Qty: 90 TAB | Refills: 1 | Status: SHIPPED | OUTPATIENT
Start: 2017-12-06 | End: 2018-08-07 | Stop reason: SDUPTHER

## 2017-12-06 RX ORDER — LISINOPRIL 10 MG/1
10 TABLET ORAL DAILY
Qty: 90 TAB | Refills: 1 | Status: SHIPPED | OUTPATIENT
Start: 2017-12-06 | End: 2018-05-23 | Stop reason: SDUPTHER

## 2017-12-06 NOTE — PROGRESS NOTES
Coordination of Care  1. Have you been to the ER, urgent care clinic since your last visit? Hospitalized since your last visit? No    2. Have you seen or consulted any other health care providers outside of the [address] [place] [address] since your last visit? Include any pap smears or colon screening. No    Medications  Does the patient need refills? NO    Learning Assessment Complete?  yes

## 2017-12-06 NOTE — PROGRESS NOTES
Assessment/Plan:       ICD-10-CM ICD-9-CM    1. Chronic pain of left knee M25.562 719.46 REFERRAL TO ORTHOPEDIC SURGERY    G89.29 338.29    2. Essential hypertension I10 401.9 lisinopril (PRINIVIL, ZESTRIL) 10 mg tablet      isosorbide mononitrate ER (IMDUR) 30 mg tablet   3. Knee pain, unspecified chronicity, unspecified laterality M25.569 719.46    4. Right medial knee pain M25.561 719.46 XR KNEE RT MAX 2 VWS      REFERRAL TO ORTHOPEDIC SURGERY   Lisinopril increased today from 5mg to 10mg. [facility] referral initiated for left knee; initiated referral for right knee, explained to patient she must have right knee x-ray prior to being able to make an appointment. [address] [place] [address]  Phone: [phone] Fax: [phone]      [facility]  Subjective:     Chief Complaint   Patient presents with    Hypertension     f/u    Knee Pain     on the front of the right knee and back of left knee    Hang Magana is a 58 y.o.  female. Used [employer]  #[id number]. HPI: systolic running 974 at home and having headaches, here with daughter who does speak some [place]. A family friend recently had a stroke and Mom is concerned about a stroke. She  has a past medical history of Essential hypertension. She has HTN (hypertension) and Allergic rhinitis on her problem list.   Review of Systems: Negative for: fever, chest pain, shortness of breath, leg swelling, exertional dyspnea, palpitations. Current Medications:   Current Outpatient Prescriptions on File Prior to Visit   Medication Sig    naproxen (NAPROSYN) 500 mg tablet Take 1 Tab by mouth two (2) times daily (with meals).  methocarbamol (ROBAXIN) 500 mg tablet Take 1 Tab by mouth nightly as needed. May take at bedtime for shoulder pain    raNITIdine (ZANTAC) 150 mg tablet Take 1 Tab by mouth two (2) times a day.     guaiFENesin SR (MUCINEX) 600 mg SR tablet Take 1 Tab by mouth two (2) times a day. No current facility-administered medications on file prior to visit. Back of the left knee - can't do an  squat. Pain on the right knee also. Past Surgical History: She  has no past surgical history on file. Social and Family History: She  reports that she quit smoking about 13 years ago. She has never used smokeless tobacco. She reports that she does not drink alcohol or use illicit drugs. ; family history includes Breast Cancer (age of onset: 71) in her sister; Cancer in her sister. Objective:     Vitals:    12/06/17 1451   BP: 128/90   Pulse: 82   Temp: 98.4 °F (36.9 °C)   TempSrc: Oral   Weight: 135 lb (61.2 kg)    No LMP recorded. Patient is postmenopausal.   Wt Readings from Last 2 Encounters:   12/06/17 135 lb (61.2 kg)   11/06/17 131 lb (59.4 kg)     No results found for any visits on 12/06/17. Physical Examination: Tenderness with palpation back of the left knee. Tenderness with palpation medial right knee. General appearance - well developed, no acute distress. Chest - clear to auscultation. Heart - regular rate and rhythm without murmurs, rubs, or gallops. Abdomen - bowel sounds present x 4, NT, ND. Extremities - pulses intact. No peripheral edema. Assessment/Plan:   Diagnoses and all orders for this visit:    1. Chronic pain of left knee  -     REFERRAL TO ORTHOPEDIC SURGERY    2. Essential hypertension  -     lisinopril (PRINIVIL, ZESTRIL) 10 mg tablet; Take 1 Tab by mouth daily. Instead of 5mg.  -     isosorbide mononitrate ER (IMDUR) 30 mg tablet; Take 1 Tab by mouth daily. 3. Knee pain, unspecified chronicity, unspecified laterality    4. Right medial knee pain  -     XR KNEE RT MAX 2 VWS; Future  -     REFERRAL TO ORTHOPEDIC SURGERY      Follow-up Disposition:  Return for 1-2 months blood pressure; to see Orthopedic s/urgeon for knees.    Edilia Rausch, DNP, FNP-BC, BC-ADM  Magali Skiff expressed understanding of this plan.

## 2017-12-06 NOTE — MR AVS SNAPSHOT
Visit Information Date & Time Provider Department Dept. Phone Encounter #  
 12/6/2017  2:55 PM Aric Wick [facility] [phone] [id number] Follow-up Instructions Return for 1-2 months blood pressure; to see Orthopedic s/urgeon for knees. Upcoming Health Maintenance Date Due DTaP/Tdap/Td series (1 - Tdap) 10/18/1976 ZOSTER VACCINE AGE 60> 8/18/2015 FOBT Q 1 YEAR AGE 50-75 7/11/2017 PAP AKA CERVICAL CYTOLOGY 2/4/2018 BREAST CANCER SCRN MAMMOGRAM 9/6/2019 Allergies as of 12/6/2017  Review Complete On: 12/6/2017 By: Lupe Lira NP No Known Allergies Current Immunizations  Reviewed on 11/6/2017 Name Date Influenza Vaccine (Quad) PF 11/6/2017, 11/9/2016, 10/22/2014 Not reviewed this visit You Were Diagnosed With   
  
 Codes Comments Chronic pain of left knee    -  Primary ICD-10-CM: M25.562, K45.03 ICD-9-CM: 719.46, 338.29 Essential hypertension     ICD-10-CM: I10 
ICD-9-CM: 401.9 Knee pain, unspecified chronicity, unspecified laterality     ICD-10-CM: M25.569 ICD-9-CM: 719.46 Right medial knee pain     ICD-10-CM: M25.561 ICD-9-CM: 719.46 Vitals BP Pulse Temp Weight(growth percentile) BMI OB Status 128/90 (BP 1 Location: Right arm, BP Patient Position: Sitting) 82 98.4 °F (36.9 °C) (Oral) 135 lb (61.2 kg) 25.51 kg/m2 Postmenopausal  
 Smoking Status Former Smoker BMI and BSA Data Body Mass Index Body Surface Area 25.51 kg/m 2 1.62 m 2 Preferred Pharmacy Pharmacy Name Phone [facility] [address] [place] [address] [phone] Your Updated Medication List  
  
   
This list is accurate as of: 12/6/17  3:33 PM.  Always use your most recent med list.  
  
  
  
  
 guaiFENesin  mg SR tablet Commonly known as:  Edward & Edward Take 1 Tab by mouth two (2) times a day. isosorbide mononitrate ER 30 mg tablet Commonly known as:  IMDUR Take 1 Tab by mouth daily. lisinopril 10 mg tablet Commonly known as:  Cote Mohsen Take 1 Tab by mouth daily. Instead of 5mg.  
  
 methocarbamol 500 mg tablet Commonly known as:  ROBAXIN Take 1 Tab by mouth nightly as needed. May take at bedtime for shoulder pain  
  
 naproxen 500 mg tablet Commonly known as:  NAPROSYN Take 1 Tab by mouth two (2) times daily (with meals). raNITIdine 150 mg tablet Commonly known as:  ZANTAC Take 1 Tab by mouth two (2) times a day. Prescriptions Sent to Pharmacy Refills  
 lisinopril (PRINIVIL, ZESTRIL) 10 mg tablet 1 Sig: Take 1 Tab by mouth daily. Instead of 5mg. Class: Normal  
 Pharmacy: [address] Ph #: [phone] Route: Oral  
 isosorbide mononitrate ER (IMDUR) 30 mg tablet 1 Sig: Take 1 Tab by mouth daily. Class: Normal  
 Pharmacy: [address] Ph #: [phone] Route: Oral  
  
We Performed the Following REFERRAL TO ORTHOPEDIC SURGERY [REF62 Custom] Comments:  
 [facility] Referral Request Form: 
Has the patient lived in the area for 6 months Yes Is the patient here on a visa No 
Priority: 
4 weeks Location: [facility] Patient Demographics: 
Date:  12/6/2017                          EMR# [id number] Bhanu Quiroz 1955 Home Phone : [phone]             Cell Phone:   
Language :  Adalgisa Specialist Requested:  Orthopedic Surgery Reason for Request:  Pain, back of the left knee Specialist Requirements: 
If GYN Referral:   
Pap: n/a If Ortho Referral: MRI n/a      
XRAY: yes Pulmonary Referrals must have :  
C-X-ray n/a OR  
CT Scan n/a Referring Provider:  Matthew Gardiner NP Discussed with patient that a referral from [facility] requires [place] [facility] [place] [address] [place] [address]. Patient stated understanding. REFERRAL TO ORTHOPEDIC SURGERY [REF62 Custom] Comments:  
 [facility] Referral Request Form: 
Has the patient lived in the area for 6 months Yes Is the patient here on a visa No 
Priority: 
4 weeks [place] [facility]                    JOSE Patient Demographics: 
Date:  12/6/2017                          EMR# [id number] Magali Skiff 1955 Home Phone : [phone]             Cell Phone:   
Language :  Adalgisa Specialist Requested:  Orthopedic Surgery Reason for Request:  Right medial knee pain Specialist Requirements: 
If GYN Referral:   
Pap: n/a If Ortho Referral: MRI n/a      
XRAY: yes Pulmonary Referrals must have :  
C-X-ray n/a OR  
CT Scan n/a Referring Provider:  Cristian Young NP Discussed with patient that a referral from [facility] requires [place] [facility] [place] the [address] [place] [address]. Patient stated understanding. Follow-up Instructions Return for 1-2 months blood pressure; to see Orthopedic s/urgeon for knees. To-Do List   
 12/07/2017 Imaging:  XR KNEE RT MAX 2 VWS Referral Information Referral ID Referred By Referred To  
  
 [id number] Angely PASTRANA Not Available Visits Status Start Date End Date 1 New Request 12/6/17 12/6/18 If your referral has a status of pending review or denied, additional information will be sent to support the outcome of this decision. Referral ID Referred By Referred To  
 [id number] Angely PASTRANA Not Available Visits Status Start Date End Date 1 New Request 12/6/17 12/6/18 If your referral has a status of pending review or denied, additional information will be sent to support the outcome of this decision. Introducing [facility]! Dear Dang Pearl: Thank you for requesting a Taket[employer] account. Our records indicate that you already have an active Taketake account. You can access your account anytime at [email or web address] Did you know that you can access your hospital and ER discharge instructions at any time in [facility]? You can also review all of your test results from your hospital stay or ER visit. Additional Information If you have questions, please visit the Frequently Asked Questions section of the [facility] website at [email or web address] Remember, [facility] is NOT to be used for urgent needs. For medical emergencies, dial 911. Now available from your iPhone and Android! Please provide this summary of care documentation to your next provider. Your primary care clinician is listed as Fam Goldstein. If you have any questions after today's visit, please call [phone].

## 2017-12-07 NOTE — PROGRESS NOTES
Attempted to reach patient by phone no answer, left VM to call clinic office back and speak to a nurse re: results.

## 2017-12-07 NOTE — PROGRESS NOTES
Result note relayed to patient re: xray of knee result and the referral to AN, explained that a SW will be in contact soon to set up the initial f/s appt.

## 2017-12-14 ENCOUNTER — TELEPHONE (OUTPATIENT)
Dept: FAMILY MEDICINE CLINIC | Age: 62
End: 2017-12-14

## 2017-12-14 NOTE — TELEPHONE ENCOUNTER
Patient called inquiring about [facility] status for her knees, per CC noted ref submitted to AN by Maria Luna and pt is enrolled. Uncertain if patient should call to make her appt or not. Asked pt to call next Tuesday and speak to Maria Luna.

## 2017-12-15 NOTE — TELEPHONE ENCOUNTER
Pt called again and left a vm yesterday afternoon when clinic was closed, attempted to return her call today, no answer, left vm to call [facility] next week Monday.

## 2018-03-05 ENCOUNTER — HOSPITAL ENCOUNTER (OUTPATIENT)
Dept: LAB | Age: 63
Discharge: HOME | End: 2018-03-05

## 2018-03-05 ENCOUNTER — OFFICE VISIT (OUTPATIENT)
Dept: FAMILY MEDICINE CLINIC | Age: 63
End: 2018-03-05

## 2018-03-05 VITALS
BODY MASS INDEX: 25.11 KG/M2 | TEMPERATURE: 98.5 F | WEIGHT: 133 LBS | OXYGEN SATURATION: 98 % | HEART RATE: 80 BPM | DIASTOLIC BLOOD PRESSURE: 80 MMHG | SYSTOLIC BLOOD PRESSURE: 111 MMHG | HEIGHT: 61 IN

## 2018-03-05 DIAGNOSIS — M25.511 ACUTE PAIN OF BOTH SHOULDERS: ICD-10-CM

## 2018-03-05 DIAGNOSIS — M79.602 BILATERAL ARM PAIN: ICD-10-CM

## 2018-03-05 DIAGNOSIS — M25.512 ACUTE PAIN OF BOTH SHOULDERS: Primary | ICD-10-CM

## 2018-03-05 DIAGNOSIS — M79.601 BILATERAL ARM PAIN: ICD-10-CM

## 2018-03-05 DIAGNOSIS — M25.512 ACUTE PAIN OF BOTH SHOULDERS: ICD-10-CM

## 2018-03-05 DIAGNOSIS — M25.511 ACUTE PAIN OF BOTH SHOULDERS: Primary | ICD-10-CM

## 2018-03-05 LAB
ALBUMIN SERPL-MCNC: 3.8 G/DL (ref 3.5–5)
ALBUMIN/GLOB SERPL: 1.2 {RATIO} (ref 1.1–2.2)
ALP SERPL-CCNC: 78 U/L (ref 45–117)
ALT SERPL-CCNC: 19 U/L (ref 12–78)
ANION GAP SERPL CALC-SCNC: 10 MMOL/L (ref 5–15)
AST SERPL-CCNC: 15 U/L (ref 15–37)
BILIRUB SERPL-MCNC: 0.4 MG/DL (ref 0.2–1)
BUN SERPL-MCNC: 11 MG/DL (ref 6–20)
BUN/CREAT SERPL: 17 (ref 12–20)
CALCIUM SERPL-MCNC: 8.7 MG/DL (ref 8.5–10.1)
CHLORIDE SERPL-SCNC: 104 MMOL/L (ref 97–108)
CHOLEST SERPL-MCNC: 191 MG/DL
CO2 SERPL-SCNC: 25 MMOL/L (ref 21–32)
CREAT SERPL-MCNC: 0.65 MG/DL (ref 0.55–1.02)
ERYTHROCYTE [DISTWIDTH] IN BLOOD BY AUTOMATED COUNT: 12.9 % (ref 11.5–14.5)
ERYTHROCYTE [SEDIMENTATION RATE] IN BLOOD: 35 MM/HR (ref 0–30)
GLOBULIN SER CALC-MCNC: 3.3 G/DL (ref 2–4)
GLUCOSE SERPL-MCNC: 98 MG/DL (ref 65–100)
HCT VFR BLD AUTO: 37.2 % (ref 35–47)
HDLC SERPL-MCNC: 47 MG/DL
HDLC SERPL: 4.1 {RATIO} (ref 0–5)
HGB BLD-MCNC: 12.1 G/DL (ref 11.5–16)
LDLC SERPL CALC-MCNC: 112.2 MG/DL (ref 0–100)
LIPID PROFILE,FLP: ABNORMAL
MCH RBC QN AUTO: 28.7 PG (ref 26–34)
MCHC RBC AUTO-ENTMCNC: 32.5 G/DL (ref 30–36.5)
MCV RBC AUTO: 88.2 FL (ref 80–99)
NRBC # BLD: 0 K/UL (ref 0–0.01)
NRBC BLD-RTO: 0 PER 100 WBC
PLATELET # BLD AUTO: 278 K/UL (ref 150–400)
PMV BLD AUTO: 9.4 FL (ref 8.9–12.9)
POTASSIUM SERPL-SCNC: 3.8 MMOL/L (ref 3.5–5.1)
PROT SERPL-MCNC: 7.1 G/DL (ref 6.4–8.2)
RBC # BLD AUTO: 4.22 M/UL (ref 3.8–5.2)
RHEUMATOID FACT SERPL-ACNC: <10 IU/ML
SODIUM SERPL-SCNC: 139 MMOL/L (ref 136–145)
TRIGL SERPL-MCNC: 159 MG/DL (ref ?–150)
VLDLC SERPL CALC-MCNC: 31.8 MG/DL
WBC # BLD AUTO: 5.9 K/UL (ref 3.6–11)

## 2018-03-05 PROCEDURE — 85652 RBC SED RATE AUTOMATED: CPT | Performed by: FAMILY MEDICINE

## 2018-03-05 PROCEDURE — 86038 ANTINUCLEAR ANTIBODIES: CPT | Performed by: FAMILY MEDICINE

## 2018-03-05 PROCEDURE — 86431 RHEUMATOID FACTOR QUANT: CPT | Performed by: FAMILY MEDICINE

## 2018-03-05 PROCEDURE — 80061 LIPID PANEL: CPT | Performed by: FAMILY MEDICINE

## 2018-03-05 PROCEDURE — 80053 COMPREHEN METABOLIC PANEL: CPT | Performed by: FAMILY MEDICINE

## 2018-03-05 PROCEDURE — 85027 COMPLETE CBC AUTOMATED: CPT | Performed by: FAMILY MEDICINE

## 2018-03-05 RX ORDER — ACETAMINOPHEN AND CODEINE PHOSPHATE 300; 30 MG/1; MG/1
1 TABLET ORAL
Qty: 30 TAB | Refills: 0 | Status: SHIPPED | OUTPATIENT
Start: 2018-03-05

## 2018-03-05 NOTE — PATIENT INSTRUCTIONS
? au Vai: H???ng D?n Ch?m Sóc - [ Shoulder Pain: Care Instructions ]  H??ng D?n Ch?m Sóc    Quý v? có th? làm ? au vai do s? d?ng vai quá yuliet?u kim m?t ho?t ??ng, ch?ng h?n nh? câu cá ho?c bóng chày. T?n th??ng này c?ng có th? x?y ra nh? m?t ph?n kim quá trình sharmin mòn hàng ngày khi quý v? ngày càng yuliet?u tu?i. T?n th??ng vai có th? ch?m lành, nh?ng tình tr?ng vai c?a quý v? s? tr? nên t?t h?n charly th?i meli. Bác s? c?a có th? ?? ngh? quý v? s? d?ng b?ng ?eo ? ? cho vai c?a quý v? ???c t?a vào. N?u quý v? b? t?n th??ng vai, quý v? có th? c?n ph?i ki?m tra và ?i?u tr? Clare Samy Ch?m sóc charly dõi là m?t ph?n francois tr?ng cho vi?c ?i?u tr? và s? an toàn c?a quý v?. ??m b?o s?p x?p và ??n t?t c? các bu?i h?n và g?i ?i?n cho bác s? n?u quý v? có v?n ?? Inocencio Bake v? c?ng nên bi?t k?t qu? xét benoit?m c?a mình và gi? l?i mihir sách các lo?i thu?c mà quý v? dùng. Quý v? có th? ch?m sóc b?n thân t?i nhà th? nào?  · U?ng thu?c gi?m ?au charly ? úng s? h??ng d?n c?a bác s? Hammad Gey N?u bác s? ?ã kê toa cho quý v? thu?c gi?m ?au, hãy u?ng charly ? úng li?u l??ng ???c kê toa. ¨ N?u không u?ng thu?c gi?m ?au charly toa, hãy h?i bác s? xem quý v? có th? u?ng thu?c không c?n kê toa hay không. ¨ Không nên u?ng alis hay yuliet?u lo?i thu?c gi?m ?au cùng m?t lúc, tr? khi bác s? yêu c?u. Yuliet?u lo?i thu?c gi?m ?au có acetaminophen, có ngh?a là Tylenol. Quá yuliet?u acetaminophen (Tylenol) có th? gây h?i.  · N?u bác s? c?a quý v? khuyên quý v? nên ? eo b?ng ?eo, hãy s? d?ng b?ng ?eo charly h??ng d?n. Không tháo b?ng ?eo ra tr? ?c khi bác s? c?a quý v? nói quý v? làm nh? v?y.  · Ch??m túi ?á hay g?c l?nh lên vùng b? ?au m?i l?n t? 10 ??n 20 phút. ??t m?t mi?ng v?i m?ng gi?a túi ?á và da. · N?u không b? s?ng, quý v? có th? ??t m?t t?m nóng ?m, t?m ch??m nóng, ho?c m?t mi?ng v?i ?m trên vai c?a quý v?. M?t s? bác s? còn khuyên dùng thay ??i gi?a t?m nóng và l?nh.  · Hãy ?? cho vai c?a quý v? ngh? ng?i kim m?t vài ngày.  N?u bác s? c?a quý v? khuy?n ngh? ?i?u ?ó, siena mcpherson ?ó quý v? có th? b?t ??u t?p th? d?c nh? nhàng cho vai, nh?ng không nh?c b?t c? v?t gì n?ng. Khi nào quý v? nên g?i tr? giúp? Hãy g?i 911 b?t c? lúc nào quý v? ngh? mình c?n ch?m sóc c?p c?u. Ví d?, hãy g?i n?u:  · Quý v? b? ?au ho?c t?c ng?c. Hi?n t??ng này có th? martinez?t hi?n cùng v?i:  ¨ ?? m? hôi.  Laretta Laity th? .  ¨ Bu?n nôn ho?c ói m?a.  ¨ C?n ?au jennifer t?a t? ng?c lên c?, hàm, m?t ho?c c? alis vai ho?c cánh aubrie. ¨ Chóng m?t ho?c choáng váng. ¨ M?ch ? ?p nhanh ho?c không ? ?u. Devorah khi g?i 911, hãy nhai 1 viên aspirin lo?i dành cho ng??i l?n. Ch? xe c?u th??ng. Không c? g?ng t? lái xe. · Cánh aubrie ho?c bàn aubrie c?a quý v? l?nh ho?c xanh tái ho?c ??i màu. G?i bác s? c?a quý v? ngay ho?c tìm n?i ch?m sóc y t? ngay n?u:  · Quý v? có các d?u hi?u solange?m trùng, nh?:  ¨ M?c ?? ?au, s?ng, nóng, ho?c ?? ?ng ? vai t?ng lên. ¨ Nh?ng v?t ?? d?n t? m?t ?i?m trên vai c?a quý v?.  ¨ M? ch?y ra t? m?t vùng trên vai c?a quý v?.  ¨ S?ng h?ch ? c?, nách ho?c háng. ¨ S?t. Gamaliel dõi k? h?n các thay ??i v? s?c kh?e và nh? liên l?c v?i bác s? n?u:  · Quý v? không th? s? d?ng vai c?a mình. · Vai c?a quý v? không t?t lên nh? moriah ??i. Quý v? có th? tìm hi?u thêm ? ?âu? Vào [email or web address] Enter F204 tìm hi?u thêm kim hô?p ti?m kiê?m \"?au Vai: H???ng D?n Ch?m Sóc - [ Shoulder Pain: Care Instructions ]. \"  Jhonnie Manoj hi?u l?c k? t?: Ngày 21 Chris?ng Ba 3, 2017  Phiên b?n n?i florina.4  © 9417-2178 Healthwise, Incorporated. H??ng d?n ch?m sóc ? ??c s?a ??i cho phù h?p gamaliel gi?y phép c?a chuyên elena ch?m sóc y t?. N?u quý v? có th??c m??c v? các ?i?u ki?n y t? ho?c h??ng d?n này, mandy vui lòng h?i chuyên elena ch?m so?c y t?. Tâ?p ?oa?n Healthwise kh??c t? m?i ??m b?o ho?c trách solange?m v? vi?c s? d?ng thông tin na?y. Trivoli Pancake: Bài t?p - [ Shoulder Stretches: Exercises ]  H??ng D?n Ch?m Sóc  ? ây là m?t s? ví d? v? bài t?p cho vai c?a quý v?. B?t ??u m?i bài t?p t? t?. T?p nh? n?u quý v? b?t ??u b? ?au.   Bác s? hay bác s? tr? li?u vât lý c?a quý v? s? cho quý v? bi?t khi nào quý v? có th? b?t ? ?u các bài t?p này và bài t?p nào s? có hi?u qu? nh?t cho quý v?.  Cách th?c hành các bài t?p  L?u ý: Nh?ng bài t?p này s? khi?n quý v? có c?m giác du?i nh?, nh?ng không ?au. Du?i vai    1. ??ng kim ô c?a và ch?ng m?t cánh aubrie vào khung c?a. Khu?u aubrie ph?i rosario h?n vai quý v? m?t chút. 2. Th? l?ng vai kim khi quý v? nghiêng v? phía tr??c ?? du?i c? ng?c và vai. Quý v? c?ng có th? aditi ng??i ra cách cánh aubrie m?t chút ? ? du?i c? solange?u h?n.  3. Gi? kim 15 ? ?n 30 giây. 4. L?p l?i 2 ??n 4 l?n cho m?i cánh aubrie. Du?i vai và ng?c    Du?i vai và ng?c  1. Cora Castro ng?i, th? l?ng thân trên [place] cho quý v? h?i s?p martinez?ng trên gh? .  2. Kim khi quý v? hít vào, ?? th?ng l?ng và m? r?ng cánh aubrie sang alis bên. 3. Nh? nhàng kéo b? vai l?i và h? martinez?ng. 4. Gi? kim 15 ? ?n 30 giây kim khi quý v? th? bình th??ng.  5. L?p l?i 2 ??n 4 l?n. [address] trên ??u    1. V?i lên trên ? ?u quý v? b?ng c? alis aubrie. 2. Gi? kim 15 ? ?n 30 giây. 3. L?p l?i 2 ??n 4 l?n. Ch?m sóc charly dõi là m?t ph?n francois tr?ng cho vi?c ?i?u tr? và s? an toàn c?a quý v?. ??m b?o s?p x?p và ??n t?t c? các bu?i h?n và g?i ?i?n cho bác s? n?u quý v? có v?n ?? Cleda Chill v? c?ng nên bi?t k?t qu? xét benoit?m c?a mình và gi? l?i mihir sách các lo?i thu?c mà quý v? dùng. Quý v? có th? tìm hi?u thêm ? ?âu? Vào [email or web address] Enter S254 tìm hi?u thêm kim hô?p ti?m kiê?m \"Du?i Vai: Bài t?p - [ Shoulder Stretches: Exercises ]. \"  Oelwein Rook hi?u l?c k? t?: Ngày 21 Chris?ng Ba 3, 2017  Phiên b?n n?i florina.4  © 0771-9386 Healthwise, Incorporated. H??ng d?n ch?m sóc ? ??c s?a ??i cho phù h?p charly gi?y phép c?a chuyên elena ch?m sóc y t?. N?u quý v? có th??c m??c v? các ?i?u ki?n y t? ho?c h??ng d?n này, mandy vui lòng h?i chuyên elena ch?m so?c y t?. Tâ?p ?oa?n Healthwise kh??c t? m?i ??m b?o ho?c trách solange?m v? vi?c s? d?ng thông tin na?y.

## 2018-03-05 NOTE — MR AVS SNAPSHOT
[address] [place] [address] [address] 
[phone] Patient: Ana Dong MRN: [id number] :1955 Visit Information Date & Time Provider Department Dept. Phone Encounter #  
 3/5/2018  8:50 AM Aric Mims [facility] [phone] [id number] Follow-up Instructions Return in about 4 weeks (around 2018). Upcoming Health Maintenance Date Due DTaP/Tdap/Td series (1 - Tdap) 10/18/1976 ZOSTER VACCINE AGE 60> 2015 FOBT Q 1 YEAR AGE 50-75 2017 PAP AKA CERVICAL CYTOLOGY 2018 BREAST CANCER SCRN MAMMOGRAM 2019 Allergies as of 3/5/2018  Review Complete On: 3/5/2018 By: Mildred Monique MD  
 No Known Allergies Current Immunizations  Reviewed on 2017 Name Date Influenza Vaccine (Quad) PF 2017, 2016, 10/22/2014 Not reviewed this visit You Were Diagnosed With   
  
 Codes Comments Acute pain of both shoulders    -  Primary ICD-10-CM: M25.511, M25.512 ICD-9-CM: 719.41 Bilateral arm pain     ICD-10-CM: E99.610, M79.602 ICD-9-CM: 729.5 Vitals BP Pulse Temp Height(growth percentile) Weight(growth percentile) SpO2  
 111/80 (BP 1 Location: Right arm) 80 98.5 °F (36.9 °C) (Oral) 5' 0.98\" (1.549 m) 133 lb (60.3 kg) 98% BMI OB Status Smoking Status 25.14 kg/m2 Postmenopausal Former Smoker Vitals History BMI and BSA Data Body Mass Index Body Surface Area  
 25.14 kg/m 2 1.61 m 2 Preferred Pharmacy Pharmacy Name Phone [facility] [address] [phone] Your Updated Medication List  
  
   
This list is accurate as of 3/5/18  9:55 AM.  Always use your most recent med list.  
  
  
  
  
 acetaminophen-codeine 300-30 mg per tablet Commonly known as:  TYLENOL-CODEINE #3  
 Take 1 Tab by mouth every six (6) hours as needed for Pain. Max Daily Amount: 4 Tabs. isosorbide mononitrate ER 30 mg tablet Commonly known as:  IMDUR Take 1 Tab by mouth daily. lisinopril 10 mg tablet Commonly known as:  Ludavid Griffina Take 1 Tab by mouth daily. Instead of 5mg. raNITIdine 150 mg tablet Commonly known as:  ZANTAC Take 1 Tab by mouth two (2) times a day. Prescriptions Printed Refills  
 acetaminophen-codeine (TYLENOL-CODEINE #3) 300-30 mg per tablet 0 Sig: Take 1 Tab by mouth every six (6) hours as needed for Pain. Max Daily Amount: 4 Tabs. Class: Print Route: Oral  
  
Follow-up Instructions Return in about 4 weeks (around 4/2/2018). To-Do List   
 03/05/2018 Lab:  DEV QL, W/REFLEX CASCADE   
  
 03/05/2018 Lab:  CBC W/O DIFF   
  
 03/05/2018 Imaging:  CT SPINE CERV W WO CONT   
  
 03/05/2018 Lab:  LIPID PANEL   
  
 03/05/2018 Lab:  METABOLIC PANEL, COMPREHENSIVE   
  
 03/05/2018 Lab:  RHEUMATOID FACTOR, QL   
  
 03/05/2018 Lab:  SED RATE (ESR) 03/05/2018 Imaging:  XR SHOULDER RT AP/LAT MIN 2 V Patient Instructions ?au Vai: H???ng D?n Ch?m Sóc - [ Shoulder Pain: Care Instructions ] H??ng D?n Ch?m Sóc Quý v? có th? làm ? au vai do s? d?ng vai quá solange?u kim m?t ho?t ??ng, ch?ng h?n nh? câu cá ho?c bóng chày. T?n th??ng này c?ng có th? x?y ra nh? m?t ph?n kim quá trình sharmin mòn hàng ngày khi quý v? ngày càng solange?u tu?i. T?n th??ng vai có th? ch?m lành, nh?ng tình tr?ng vai c?a quý v? s? tr? nên t?t h?n charly th?i meli. Bác s? c?a có th? ?? ngh? quý v? s? d?ng b?ng ?eo ? ? cho vai c?a quý v? ???c t?a vào. N?u quý v? b? t?n th??ng vai, quý v? có th? c?n ph?i ki?m tra và ?i?u tr? Rhianna Matar  
Ch?m sóc charly dõi là m?t ph?n francois tr?ng cho vi?c ?i?u tr? và s? an toàn c?a quý v?. ??m b?o s?p x?p và ??n t?t c? các bu?i h?n và g?i ?i?n cho bác s? n?u quý v? có v?n ?? Tresea Blocker v? c?ng nên bi?t k?t qu? xét benoit?m c?a mình và gi? l?i mihir sách các lo?i thu?c mà quý v? dùng. Quý v? có th? ch?m sóc b?n thân t?i nhà th? nào? 
· U?ng thu?c gi?m ?au charly ? úng s? h??ng d?n c?a bác s? Kamran Canes N?u bác s? ?ã kê toa cho quý v? thu?c gi?m ?au, hãy u?ng charly ? úng li?u l??ng ???c kê toa. ¨ N?u không u?ng thu?c gi?m ?au charly toa, hãy h?i bác s? xem quý v? có th? u?ng thu?c không c?n kê toa hay không. ¨ Không nên u?ng alis hay yuliet?u lo?i thu?c gi?m ?au cùng m?t lúc, tr? khi bác s? yêu c?u. Yuliet?u lo?i thu?c gi?m ?au có acetaminophen, có ngh?a là Tylenol. Quá yuliet?u acetaminophen (Tylenol) có th? gây h?i. 
· N?u bác s? c?a quý v? khuyên quý v? nên ? eo b?ng ?eo, hãy s? d?ng b?ng ?eo charly h??ng d?n. Không tháo b?ng ?eo ra tr? ?c khi bác s? c?a quý v? nói quý v? làm nh? v?y. 
· Ch??m túi ?á hay g?c l?nh lên vùng b? ?au m?i l?n t? 10 ??n 20 phút. ??t m?t mi?ng v?i m?ng gi?a túi ?á và da. · N?u không b? s?ng, quý v? có th? ??t m?t t?m nóng ?m, t?m ch??m nóng, ho?c m?t mi?ng v?i ?m trên vai c?a quý v?. M?t s? bác s? còn sae mendoza thay ??i gi?a t?m nóng và l?nh. 
· Hãy ?? cho vai c?a quý v? ngh? ng?i kim m?t vài ngày. N?u bác s? c?a quý v? khuy?n ngh? ?i?u ?ó, thì khi ?ó quý v? có th? b?t ??u t?p th? d?c nh? nhàng cho vai, nh?ng không nh?c b?t c? v?t gì n?ng. Khi nào quý v? nên g?i tr? giúp? Hãy g?i 911 b?t c? lúc nào quý v? ngh? mình c?n ch?m sóc c?p c?u. Ví d?, hãy g?i n?u: 
· Quý v? b? ?au ho?c t?c ng?c. Hi?n t??ng này có th? martinez?t hi?n cùng v?i: 
¨ ?? m? hôi. 
Warnell Roxana th? . 
¨ Bu?n nôn ho?c ói m?a. 
¨ C?n ?au jennifer t?a t? ng?c lên c?, hàm, m?t ho?c c? alis vai ho?c cánh aubrie. ¨ Chóng m?t ho?c choáng váng. ¨ M?ch ? ?p nhanh ho?c không ? ?u. Devorah khi g?i 911, hãy nhai 1 viên aspirin lo?i dành cho ng??i l?n. Ch? xe c?u th??ng. Không c? g?ng t? lái xe. · Cánh aubrie ho?c bàn aubrie c?a quý v? l?nh ho?c xanh tái ho?c ??i màu.  
G?i bác s? c?a quý v? ngay ho?josse matias n?i ch?m sóc y t? magalys n?u: 
 · Quý v? có các d?u hi?u solange?m trùng, nh?: 
¨ M?c ?? ?au, s?ng, nóng, ho?c ?? ?ng ? vai t?ng lên. ¨ Nh?ng v?t ?? d?n t? m?t ?i?m trên vai c?a quý v?. 
¨ M? ch?y ra t? m?t vùng trên vai c?a quý v?. 
¨ S?ng h?ch ? c?, nách ho?c háng. ¨ S?t. Gamaliel dõi k? h?n các thay ??i v? s?c kh?e và nh? liên l?c v?i bác s? n?u: 
· Quý v? không th? s? d?ng vai c?a mình. · Vai c?a quý v? không t?t lên nh? moriah ??i. Quý v? có th? tìm hi?u thêm ? ?âu? Vào [email or web address] Enter L469 tìm hi?u thêm kim hô?p ti?m kiê?m \"?au Vai: H???ng D?n Ch?m Sóc - [ Shoulder Pain: Care Instructions ]. \" 
Sushant Baumara hi?u l?c k? t?: [address]?ng Ba 3, 2017 Phiên b?n n?i florina.4 © 4812-5263 [employer]wise, [employer]orporated. H??ng d?n ch?m sóc ? ??c s?a ??i cho phù h?p gamaliel gi?y phép c?a chuyên elena ch?m sóc y t?. N?u quý v? có th??c m??c v? các ?i?u ki?n y t? ho?c h??ng d?n này, mandy vui lòng h?i chuyên elena ch?m so?c y t?. Tâ?p ?oa?n Healthwise kh??c t? m?i ??m b?o ho?c trách solange?m v? vi?c s? d?ng thông tin na?y. Elyssa Fonder: Bài t?p - [ Shoulder Stretches: Exercises ] H??ng D?n Ch?m Sóc 
? ây là m?t s? ví d? v? bài t?p cho vai c?a quý v?. B?t ??u m?i bài t?p t? t?. T?p nh? n?u quý v? b?t ??u b? ?au. Bác s? hay bác s? tr? li?u vât lý c?a quý v? s? cho quý v? bi?t khi nào quý v? có th? b?t ? ?u các bài t?p này và bài t?p nào s? có hi?u qu? nh?t cho quý v?. 
Cách th?c hành các bài t?p 
L?u ý: Nh?ng bài t?p này s? khi?n quý v? có c?m giác du?i nh?, nh?ng không ?au. Du?i vai 
 
1. ??ng kim ô c?a và ch?ng m?t cánh aubrie vào khung c?a. Khu?u aubrie ph?i rosario h?n vai quý v? m?t chút. 2. Th? l?ng vai kim khi quý v? nghiêng v? phía tr??c ?? du?i c? ng?c và vai. Quý v? c?ng có th? aditi ng??i ra cách cánh aubrie m?t chút ? ? du?i c? solange?u h?n. 
3. Gi? kim 15 ? ?n 30 giây. 4. L?p l?i 2 ??n 4 l?n cho m?i cánh aubrie. Du?i vai và ng?c 
 
Du?i vai và ng?c 1. Francia Bath ng?i, th? l?ng thân trên [place] cho quý v? h?i s?p martinez?ng trên gh? Corrinne Dresser 
 2. Kim khi quý v? hít vào, ?? th?ng l?ng và m? r?ng cánh aubrie sang alis bên. 3. Nh? nhàng kéo b? vai l?i và h? martinez?ng. 4. Gi? kim 15 ? ?n 30 giây kim khi quý v? th? bình th??ng. 
5. L?p l?i 2 ??n 4 l?n. [address] trên ??u 
 
1. V?i lên trên ? ?u quý v? b?ng c? alis aubrie. 2. Gi? kim 15 ? ?n 30 giây. 3. L?p l?i 2 ??n 4 l?n. Ch?m sóc charly dõi là m?t ph?n francois tr?ng cho vi?c ?i?u tr? và s? an toàn c?a quý v?. ??m b?o s?p x?p và ??n t?t c? các bu?i h?n và g?i ?i?n cho bác s? n?u quý v? có v?n ?? Peggye Polka v? c?ng nên bi?t k?t qu? xét benoit?m c?a mình và gi? l?i mihir sách các lo?i thu?c mà quý v? dùng. Quý v? có th? tìm hi?u thêm ? ?âu? Vào [email or web address] Enter S254 tìm hi?u thêm kim hô?p ti?m kiê?m \"Du?i Vai: Bài t?p - [ Shoulder Stretches: Exercises ]. \" 
Sherryle Samuels hi?u l?c k? t?: [address]?ng Ba 3, 2017 Phiên b?n n?i florina.4 © 8798-1821 [employer]wise, Incorporated. H??ng d?n ch?m sóc ? ??c s?a ??i cho phù h?p charly gi?y phép c?a chuyên elena ch?m sóc y t?. N?u quý v? có th??c m??c v? các ?i?u ki?n y t? ho?c h??ng d?n này, mandy prajapatii lòng h?i lore parker ch?m so?c y t?. Tâ?p ?oa?n Healthwise kh??c t? m?i ??m b?o ho?c trách solange?m v? vi?c s? d?ng thông tin na?y. Introducing [facility]! Dear Kobe Tripathi: Thank you for requesting a Tagito account. Our records indicate that you already have an active Tagito account. You can access your account anytime at [email or web address] Did you know that you can access your hospital and ER discharge instructions at any time in Tagito? You can also review all of your test results from your hospital stay or ER visit. Additional Information If you have questions, please visit the Frequently Asked Questions section of the Tagito website at [email or web address] Remember, Tagito is NOT to be used for urgent needs. For medical emergencies, dial 911. Now available from your iPhone and Android! Please provide this summary of care documentation to your next provider. Your primary care clinician is listed as Laisha Goldstein. If you have any questions after today's visit, please call [phone].

## 2018-03-05 NOTE — PROGRESS NOTES
Coordination of Care  1. Have you been to the ER, urgent care clinic since your last visit? Hospitalized since your last visit? No    2. Have you seen or consulted any other health care providers outside of the [address] [place] [address] since your last visit? Include any pap smears or colon screening. No    Does the patient need refills? YES    Learning Assessment Complete?  yes

## 2018-03-05 NOTE — PROGRESS NOTES
Eliane Ga is a 58 y.o. female presents to clinic accompanied by a friend    Issues discussed today include:    Chief Complaint   Patient presents with    Shoulder Pain     pt c/o severe pain on both shoulders, hands and fingers. Robotoki interpretor [id number] for Danish was used to obtain history. 1) Arm pain:  C/o shoulder pain and arm pain x 2-3 months. Pt is R hand dominant. Pain from tips of fingers to top of shoulder on R side. In L arm only from shoulder to elbow. Every time she lifts her arms, she feels the pain running through her arms. She uses hot oil to massage her arms and this helps a little. Also tried aleve for the pain, but not helping. Also upsets her stomach. Pain usually worse after her work, works as . Does have morning stiffness, lasts just 10 mins. Very difficult to put on her shirt in am or brush her hair bc lifting arms in painful. Also with left hip pain, uses a massage machine every am and this helps. Denies other joint pain. When arm pain is severe, she has to stop and do deep breathing for a few minutes. Usually needs to do this at end of day. Feels like blood rushes to L sided head when pain is severe - this has happened 3 times in the last month. Also gets L chin pain - 5 times in the last 3 weeks. Also reports neck stiffness in the night. Denies neck injury. Denies fever chills, nausea, vomiting. Denies CP, SOB or association of arm pain with exertion. Data reviewed or ordered today:       Other problems include:  Patient Active Problem List   Diagnosis Code    HTN (hypertension) I10    Allergic rhinitis J30.9       Medications:  Current Outpatient Prescriptions   Medication Sig Dispense Refill    acetaminophen-codeine (TYLENOL-CODEINE #3) 300-30 mg per tablet Take 1 Tab by mouth every six (6) hours as needed for Pain. Max Daily Amount: 4 Tabs. 30 Tab 0    lisinopril (PRINIVIL, ZESTRIL) 10 mg tablet Take 1 Tab by mouth daily. Instead of 5mg. 90 Tab 1    isosorbide mononitrate ER (IMDUR) 30 mg tablet Take 1 Tab by mouth daily. 90 Tab 1    raNITIdine (ZANTAC) 150 mg tablet Take 1 Tab by mouth two (2) times a day. 60 Tab 3       Allergies:  No Known Allergies    LMP:  No LMP recorded. Patient is postmenopausal.    Social History     Social History    Marital status:      Spouse name: N/A    Number of children: N/A    Years of education: N/A     Occupational History    Not on file. Social History Main Topics    Smoking status: Former Smoker     Quit date: 9/13/2004    Smokeless tobacco: Never Used    Alcohol use No    Drug use: No    Sexual activity: Not on file     Other Topics Concern    Not on file     Social History Narrative       Family History   Problem Relation Age of Onset    Cancer Sister      breast    Breast Cancer Sister 71         Physical Exam   Visit Vitals    /80 (BP 1 Location: Right arm)    Pulse 80    Temp 98.5 °F (36.9 °C) (Oral)    Ht 5' 0.98\" (1.549 m)    Wt 133 lb (60.3 kg)    SpO2 98%    BMI 25.14 kg/m2      BP Readings from Last 3 Encounters:   03/05/18 111/80   12/06/17 128/90   11/06/17 (!) 133/95     Constitutional: Appears well,  No acute distress, Vitals noted  Psychiatric:  Affect normal, Alert and Oriented to person/place/time  Eyes:  Conjunctiva clear, no drainage  ENT:  External ears and nose normal, Mucous membranes moist  Neck:  General inspection normal. Supple. Neg Spurling test.  Heart:  Normal HR, Normal S1 and S2,  Regular rhythm. No murmurs, rubs or gallops. Lungs:  Clear to auscultation, good respiratory effort, no wheezes, rales or rhonchi  Extremities: Without edema, good peripheral pulses  MSK: Limited active> passive ROM in bilateral shoulders, no tenderness to palpation or deformity/atrophy. Strength 3/5 in shoulders bilat, 4+/5  strength bilaterally. Special tests are all painful and difficult for pt.   Skin:  Warm to palpation, without rashes  Neuro: CNII-XII intact, symmetric and intact sensation to light touch throughout, equal and full motor strength in all extremities, DTRs symmetric and normal      Assessment/Plan:      ICD-10-CM ICD-9-CM    1. Acute pain of both shoulders M25.511 719.41 CBC W/O DIFF    M25.512  LIPID PANEL      SED RATE (ESR)      METABOLIC PANEL, COMPREHENSIVE      DEV QL, W/REFLEX CASCADE      RHEUMATOID FACTOR, QL      CT SPINE CERV W WO CONT      XR SHOULDER RT AP/LAT MIN 2 V      acetaminophen-codeine (TYLENOL-CODEINE #3) 300-30 mg per tablet   2. Bilateral arm pain M79.601 729.5 acetaminophen-codeine (TYLENOL-CODEINE #3) 300-30 mg per tablet    M79.602         Ddx: Rheum arthritis, polymyaglia rheumatica, polymyositis, cervical radiculopathy, less likely CNS process  - Will get labs as per above  - CT c-spine to look for impingement  - R shoulder XR to r/o arthritis  - Tylenol #3 given stomach upset with NSAIDS, h/o HTN with max aleve dosing not helping ( reviewed, no rx's in the last 12 mo)    Greater than 50% of this 25 minute appt was spent in counseling patient about: possible etiologies of her pain, therapy recommended and follow up plan    Follow-up Disposition:  Return in about 4 weeks (around 4/2/2018).         Denise Weinstein MD  [address] [place] [address]

## 2018-03-07 LAB
ANA SER QL: NEGATIVE
SEE BELOW:, 164879: NORMAL

## 2018-03-12 ENCOUNTER — HOSPITAL ENCOUNTER (OUTPATIENT)
Dept: CT IMAGING | Age: 63
Discharge: HOME | End: 2018-03-12
Attending: FAMILY MEDICINE
Payer: SUBSIDIZED

## 2018-03-12 ENCOUNTER — HOSPITAL ENCOUNTER (OUTPATIENT)
Dept: GENERAL RADIOLOGY | Age: 63
Discharge: HOME | End: 2018-03-12
Attending: FAMILY MEDICINE
Payer: SUBSIDIZED

## 2018-03-12 DIAGNOSIS — M25.511 ACUTE PAIN OF BOTH SHOULDERS: ICD-10-CM

## 2018-03-12 DIAGNOSIS — M25.512 ACUTE PAIN OF BOTH SHOULDERS: ICD-10-CM

## 2018-03-12 PROCEDURE — 73030 X-RAY EXAM OF SHOULDER: CPT

## 2018-03-12 PROCEDURE — 72125 CT NECK SPINE W/O DYE: CPT

## 2018-03-13 DIAGNOSIS — M47.22 CERVICAL RADICULOPATHY DUE TO DEGENERATIVE JOINT DISEASE OF SPINE: Primary | ICD-10-CM

## 2018-03-13 RX ORDER — PREDNISONE 10 MG/1
TABLET ORAL
Qty: 40 TAB | Refills: 0 | Status: SHIPPED | OUTPATIENT
Start: 2018-03-13 | End: 2018-11-19

## 2018-03-13 NOTE — PROGRESS NOTES
CT c spine shows multilevel degenerative changes  She has mod to severe foraminal narrowing at C3-4, could explain shoulder girdle pain and weakness  Will place referral to Ortho spine and forward to LINDEN CORREA nurses

## 2018-03-13 NOTE — PROGRESS NOTES
Nurse to please call and inform patient of the following (** represent recommendations for patient) - thank you (Pt needs Luxembourgish interpretor):    ESR elevated, but DEV and rheumatoid factor neg  ** It is possible her sxs are from an inflammatory or autoimmune condition, but CT of her C-spine also revealed mod to severe foraminal stenosis (narrowing of the outlet where nerve exits spine) on R at C3-4 level, which could explain pt's shoulder and arm pain, weakness. ** I have prescribed her a steroid taper which should help her pain if either of the above causes  ** She should STOP all other NSAIDs (no naproxen/aleve, ibuprofen/motrin, aspirin, diclofenac, meloxicam)   ** She should take zantac bid  ** Call us if she develops stomach pain on the prednisone    CBC normal, no anemia or sign of infection   CMP wnl, no kidney, liver sugar or electrolyte problems identified  Lipid panel - LDL (112) a little high, panel stable from 3 yrs ago. 10 yr ASCVD risk 4.4%. ** Recommend healthy diet, low is saturated fat and 150 mins of aerobic exercise weekly. No statin indicated at this time.

## 2018-03-19 ENCOUNTER — TELEPHONE (OUTPATIENT)
Dept: FAMILY MEDICINE CLINIC | Age: 63
End: 2018-03-19

## 2018-03-19 NOTE — PROGRESS NOTES
2nd attempt at reaching patient, no answer, left a vm to call nurse at [facility]. Will send letter also.

## 2018-03-19 NOTE — TELEPHONE ENCOUNTER
Pt daughter on phi returned call about recent lab result note from Dr Justina Larios; this was reviewed in great length with pt daughter who did refuse Vantu  because she did not have enough battery but expressed that she understood what was discussed re: the mediation changes and prednisone.

## 2018-03-21 ENCOUNTER — TELEPHONE (OUTPATIENT)
Dept: FAMILY MEDICINE CLINIC | Age: 63
End: 2018-03-21

## 2018-03-21 RX ORDER — BENZONATATE 100 MG/1
100 CAPSULE ORAL
Qty: 30 CAP | Refills: 0 | Status: SHIPPED | OUTPATIENT
Start: 2018-03-21 | End: 2018-11-19

## 2018-03-21 RX ORDER — GUAIFENESIN 600 MG/1
600 TABLET, EXTENDED RELEASE ORAL
Qty: 60 TAB | Refills: 0 | Status: SHIPPED | OUTPATIENT
Start: 2018-03-21 | End: 2018-11-19

## 2018-03-21 NOTE — TELEPHONE ENCOUNTER
Ok to take tylenol for HA and aches  I have sent rx for mucinex and tessalon to her pharmacy for help with cough, both safe to take with h/o HTN and current prednisone use

## 2018-03-21 NOTE — TELEPHONE ENCOUNTER
Adalgisa int # [id number] used. Received call from Josephine Navarro, pt daughter on phi, who had question about her mom having cold/flu sx. First gave the recent lab result note to pt daughter over phone to make sure she understood instructions. Pt c/o of fever, headache, cough and achiness which began this morning ( they had a young nephew in the house who also had flu). Pt wants to use an otc cold/flu medicine which she confirms does not have nsaids, told pt that should be fine but will let provider know. Pt has run out of the tylenol#3. Pt confirms taking all medicines on her list, however daughter was not sure if pt is taking the IMDUR, explained to her she should be taking along with lisinopril for BP management. No further concerns.

## 2018-03-22 NOTE — TELEPHONE ENCOUNTER
Tel call placed to pt to inform her of the Rx sent to pharmacy, no answer, left vm on self-identified # with the information.

## 2018-03-26 ENCOUNTER — OFFICE VISIT (OUTPATIENT)
Dept: FAMILY MEDICINE CLINIC | Age: 63
End: 2018-03-26

## 2018-03-26 VITALS
BODY MASS INDEX: 25.14 KG/M2 | WEIGHT: 133 LBS | SYSTOLIC BLOOD PRESSURE: 127 MMHG | HEART RATE: 78 BPM | TEMPERATURE: 98.6 F | OXYGEN SATURATION: 95 % | DIASTOLIC BLOOD PRESSURE: 88 MMHG

## 2018-03-26 DIAGNOSIS — J32.0 MAXILLARY SINUSITIS, UNSPECIFIED CHRONICITY: Primary | ICD-10-CM

## 2018-03-26 RX ORDER — AMOXICILLIN 500 MG/1
500 CAPSULE ORAL 3 TIMES DAILY
Qty: 30 CAP | Refills: 0 | Status: SHIPPED | OUTPATIENT
Start: 2018-03-26 | End: 2018-04-05

## 2018-03-26 NOTE — PROGRESS NOTES
Assessment/Plan:       ICD-10-CM ICD-9-CM    1. Maxillary sinusitis, unspecified chronicity J32.0 473.0 amoxicillin (AMOXIL) 500 mg capsule      varicella zoster vaccine live (ZOSTAVAX) 19,400 unit/0.65 mL susr injection     Follow-up Disposition:  Return if symptoms worsen or fail to improve. [address] [address]  Phone: [phone] Fax: [phone]      [facility]  Subjective:     Chief Complaint   Patient presents with    Cold Symptoms     Cough, fever, wheezing, sinus pain, headaches X about 1 week    Fátima Godinez is a 58 y.o.  female. AG&P  #[id number] Daytime when she is working she has cough. At night has to cough and wakes up. Coughs x 2 hours. Dry cough. Never before. She has had the flu vaccine. When she breathes out she hears sounds at night. Since taking the prednisone she is feeling better. HPI: Dayquil and Nyquil for a week and prednisone 10mg taper. She  has a past medical history of Essential hypertension. Review of Systems: Negative for: fever, chest pain, shortness of breath, leg swelling, exertional dyspnea, palpitations. Current Medications:   Current Outpatient Prescriptions on File Prior to Visit   Medication Sig    benzonatate (TESSALON) 100 mg capsule Take 1 Cap by mouth three (3) times daily as needed for Cough.  guaiFENesin ER (MUCINEX) 600 mg ER tablet Take 1 Tab by mouth two (2) times daily as needed for Congestion (cough).  predniSONE (DELTASONE) 10 mg tablet Take 4 tabs (40mg) x 5 days. Then take 3 tabs (30mg) x 3 days. Then take 2 tabs (20mg) x 3 days. Then take 1 tab (10mg) x 3 days.  acetaminophen-codeine (TYLENOL-CODEINE #3) 300-30 mg per tablet Take 1 Tab by mouth every six (6) hours as needed for Pain. Max Daily Amount: 4 Tabs.  lisinopril (PRINIVIL, ZESTRIL) 10 mg tablet Take 1 Tab by mouth daily. Instead of 5mg.     isosorbide mononitrate ER (IMDUR) 30 mg tablet Take 1 Tab by mouth daily.  raNITIdine (ZANTAC) 150 mg tablet Take 1 Tab by mouth two (2) times a day. No current facility-administered medications on file prior to visit. Social History: She  reports that she quit smoking about 13 years ago. She has never used smokeless tobacco. She reports that she does not drink alcohol or use illicit drugs. Objective:     Vitals:    03/26/18 1506   BP: 127/88   Pulse: 78   Temp: 98.6 °F (37 °C)   TempSrc: Oral   SpO2: 95%   Weight: 133 lb (60.3 kg)    No LMP recorded. Patient is postmenopausal.   Wt Readings from Last 2 Encounters:   03/26/18 133 lb (60.3 kg)   03/05/18 133 lb (60.3 kg)     No results found for any visits on 03/26/18. Physical Examination: + maxillary sinus tenderness with palpation. General appearance - well developed, no acute distress. Chest - clear to auscultation. Heart - regular rate and rhythm without murmurs, rubs, or gallops. Abdomen - bowel sounds present x 4, NT, ND. Extremities - pulses intact. No peripheral edema. Assessment/Plan:   Diagnoses and all orders for this visit:    1. Maxillary sinusitis, unspecified chronicity  -     amoxicillin (AMOXIL) 500 mg capsule; Take 1 Cap by mouth three (3) times daily for 10 days. -     varicella zoster vaccine live (ZOSTAVAX) 19,400 unit/0.65 mL susr injection; 1 Vial by SubCUTAneous route once for 1 dose. Follow-up Disposition:  Return if symptoms worsen or fail to improve. She is congested in the nares. She took sudafed 30mg 1 pill yesterday. Celi Savage, DNP, FNP-BC, BC-ADM  Moose Bigg expressed understanding of this plan.

## 2018-03-26 NOTE — PROGRESS NOTES
Coordination of Care  1. Have you been to the ER, urgent care clinic since your last visit? Hospitalized since your last visit? No    2. Have you seen or consulted any other health care providers outside of the [facility] since your last visit? Include any pap smears or colon screening. No    Does the patient need refills? N/A    Learning Assessment Complete?  yes

## 2018-05-23 DIAGNOSIS — I10 ESSENTIAL HYPERTENSION: ICD-10-CM

## 2018-05-25 RX ORDER — LISINOPRIL 10 MG/1
TABLET ORAL
Qty: 90 TAB | Refills: 1 | Status: SHIPPED | OUTPATIENT
Start: 2018-05-25 | End: 2018-11-19 | Stop reason: SDUPTHER

## 2018-06-05 DIAGNOSIS — M54.12 CERVICAL RADICULAR PAIN: Primary | ICD-10-CM

## 2018-08-07 ENCOUNTER — OFFICE VISIT (OUTPATIENT)
Dept: FAMILY MEDICINE CLINIC | Age: 63
End: 2018-08-07

## 2018-08-07 ENCOUNTER — HOSPITAL ENCOUNTER (OUTPATIENT)
Dept: GENERAL RADIOLOGY | Age: 63
Discharge: HOME | End: 2018-08-07
Payer: SUBSIDIZED

## 2018-08-07 VITALS
BODY MASS INDEX: 25.33 KG/M2 | TEMPERATURE: 98.7 F | WEIGHT: 134 LBS | SYSTOLIC BLOOD PRESSURE: 119 MMHG | HEART RATE: 91 BPM | DIASTOLIC BLOOD PRESSURE: 81 MMHG

## 2018-08-07 DIAGNOSIS — S89.91XA INJURY OF RIGHT KNEE, INITIAL ENCOUNTER: Primary | ICD-10-CM

## 2018-08-07 DIAGNOSIS — S89.91XA INJURY OF RIGHT KNEE, INITIAL ENCOUNTER: ICD-10-CM

## 2018-08-07 DIAGNOSIS — I10 ESSENTIAL HYPERTENSION: ICD-10-CM

## 2018-08-07 PROCEDURE — 73562 X-RAY EXAM OF KNEE 3: CPT

## 2018-08-07 RX ORDER — DICLOFENAC SODIUM 75 MG/1
75 TABLET, DELAYED RELEASE ORAL 2 TIMES DAILY
Qty: 60 TAB | Refills: 0 | Status: SHIPPED | OUTPATIENT
Start: 2018-08-07

## 2018-08-07 RX ORDER — ISOSORBIDE MONONITRATE 30 MG/1
30 TABLET, EXTENDED RELEASE ORAL DAILY
Qty: 90 TAB | Refills: 1 | Status: SHIPPED | OUTPATIENT
Start: 2018-08-07 | End: 2018-11-19 | Stop reason: SDUPTHER

## 2018-08-07 NOTE — PATIENT INSTRUCTIONS
Knee Pain or Injury: Care Instructions  Your Care Instructions    Injuries are a common cause of knee problems. Sudden (acute) injuries may be caused by a direct blow to the knee. They can also be caused by abnormal twisting, bending, or falling on the knee. Pain, bruising, or swelling may be severe, and may start within minutes of the injury. Overuse is another cause of knee pain. Other causes are climbing stairs, kneeling, and other activities that use the knee. Everyday wear and tear, especially as you get older, also can cause knee pain. Rest, along with home treatment, often relieves pain and allows your knee to heal. If you have a serious knee injury, you may need tests and treatment. Follow-up care is a key part of your treatment and safety. Be sure to make and go to all appointments, and call your doctor if you are having problems. It's also a good idea to know your test results and keep a list of the medicines you take. How can you care for yourself at home? · Be safe with medicines. Read and follow all instructions on the label. ¨ If the doctor gave you a prescription medicine for pain, take it as prescribed. ¨ If you are not taking a prescription pain medicine, ask your doctor if you can take an over-the-counter medicine. · Rest and protect your knee. Take a break from any activity that may cause pain. · Put ice or a cold pack on your knee for 10 to 20 minutes at a time. Put a thin cloth between the ice and your skin. · Prop up a sore knee on a pillow when you ice it or anytime you sit or lie down for the next 3 days. Try to keep it above the level of your heart. This will help reduce swelling. · If your knee is not swollen, you can put moist heat, a heating pad, or a warm cloth on your knee. · If your doctor recommends an elastic bandage, sleeve, or other type of support for your knee, wear it as directed.   · Follow your doctor's instructions about how much weight you can put on your leg. Use a cane, crutches, or a walker as instructed. · Follow your doctor's instructions about activity during your healing process. If you can do mild exercise, slowly increase your activity. · Reach and stay at a healthy weight. Extra weight can strain the joints, especially the knees and hips, and make the pain worse. Losing even a few pounds may help. When should you call for help? Call 911 anytime you think you may need emergency care. For example, call if:    · You have symptoms of a blood clot in your lung (called a pulmonary embolism). These may include:  ¨ Sudden chest pain. ¨ Trouble breathing. ¨ Coughing up blood.    Call your doctor now or seek immediate medical care if:    · You have severe or increasing pain.     · Your leg or foot turns cold or changes color.     · You cannot stand or put weight on your knee.     · Your knee looks twisted or bent out of shape.     · You cannot move your knee.     · You have signs of infection, such as:  ¨ Increased pain, swelling, warmth, or redness. ¨ Red streaks leading from the knee. ¨ Pus draining from a place on your knee. ¨ A fever.     · You have signs of a blood clot in your leg (called a deep vein thrombosis), such as:  ¨ Pain in your calf, back of the knee, thigh, or groin. ¨ Redness and swelling in your leg or groin.    Watch closely for changes in your health, and be sure to contact your doctor if:    · You have tingling, weakness, or numbness in your knee.     · You have any new symptoms, such as swelling.     · You have bruises from a knee injury that last longer than 2 weeks.     · You do not get better as expected. Where can you learn more? Go to http://malachi-dustin.info/. Enter K195 in the search box to learn more about \"Knee Pain or Injury: Care Instructions. \"  Current as of: November 20, 2017  Content Version: 11.7  © 9600-7407 [employer], [employer].  Care instructions adapted under license by [employer] (which disclaims liability or warranty for this information). If you have questions about a medical condition or this instruction, always ask your healthcare professional. [address] any warranty or liability for your use of this information.

## 2018-08-07 NOTE — PROGRESS NOTES
Coordination of Care  1. Have you been to the ER, urgent care clinic since your last visit? Hospitalized since your last visit? No    2. Have you seen or consulted any other health care providers outside of the [address] [place] [address] since your last visit? Include any pap smears or colon screening. No    Does the patient need refills? NO    Learning Assessment Complete?  yes  Depression Screening complete in the past 12 months? yes

## 2018-08-07 NOTE — MR AVS SNAPSHOT
[address] [place] [id number] [phone] Patient: Maye Wiseman MRN: [id number] :1955 Visit Information Date & Time Provider Department Dept. Phone Encounter #  
 2018 10:30 AM Kamar Gomez [address], [address] [facility] [phone] [id number] Follow-up Instructions Return in about 3 months (around 2018), or if symptoms worsen or fail to improve. Upcoming Health Maintenance Date Due DTaP/Tdap/Td series (1 - Tdap) 10/18/1976 ZOSTER VACCINE AGE 60> 2015 FOBT Q 1 YEAR AGE 50-75 2017 PAP AKA CERVICAL CYTOLOGY 2018 Influenza Age 5 to Adult 2018 BREAST CANCER SCRN MAMMOGRAM 2019 Allergies as of 2018  Review Complete On: 3/26/2018 By: Diane Rudolph NP No Known Allergies Current Immunizations  Reviewed on 2017 Name Date Influenza Vaccine (Quad) PF 2017, 2016, 10/22/2014 Not reviewed this visit You Were Diagnosed With   
  
 Codes Comments Injury of right knee, initial encounter    -  Primary ICD-10-CM: O90.13WQ ICD-9-CM: 959.7 Essential hypertension     ICD-10-CM: I10 
ICD-9-CM: 401.9 Vitals BP Pulse Temp Weight(growth percentile) BMI OB Status 119/81 (BP 1 Location: Left arm, BP Patient Position: Sitting) 91 98.7 °F (37.1 °C) (Oral) 134 lb (60.8 kg) 25.33 kg/m2 Postmenopausal  
 Smoking Status Former Smoker Vitals History BMI and BSA Data Body Mass Index Body Surface Area  
 25.33 kg/m 2 1.62 m 2 Preferred Pharmacy Pharmacy Name Phone [facility] [address] [phone] Your Updated Medication List  
  
   
This list is accurate as of 18 11:00 AM.  Always use your most recent med list.  
  
  
  
  
 acetaminophen-codeine 300-30 mg per tablet Commonly known as:  TYLENOL-CODEINE #3  
 Take 1 Tab by mouth every six (6) hours as needed for Pain. Max Daily Amount: 4 Tabs. benzonatate 100 mg capsule Commonly known as:  TESSALON Take 1 Cap by mouth three (3) times daily as needed for Cough. diclofenac EC 75 mg EC tablet Commonly known as:  VOLTAREN Take 1 Tab by mouth two (2) times a day. guaiFENesin  mg ER tablet Commonly known as:  Edward & Edward Take 1 Tab by mouth two (2) times daily as needed for Congestion (cough). isosorbide mononitrate ER 30 mg tablet Commonly known as:  IMDUR Take 1 Tab by mouth daily. lisinopril 10 mg tablet Commonly known as:  PRINIVIL, ZESTRIL  
TAKE ONE TABLET BY MOUTH ONCE DAILY predniSONE 10 mg tablet Commonly known as:  Nylaanalisa Yi Take 4 tabs (40mg) x 5 days. Then take 3 tabs (30mg) x 3 days. Then take 2 tabs (20mg) x 3 days. Then take 1 tab (10mg) x 3 days. raNITIdine 150 mg tablet Commonly known as:  ZANTAC Take 1 Tab by mouth two (2) times a day. Prescriptions Sent to Pharmacy Refills  
 isosorbide mononitrate ER (IMDUR) 30 mg tablet 1 Sig: Take 1 Tab by mouth daily. Class: Normal  
 Pharmacy: [facility] DR JUDY DAMIAN [address] [place] [address] Ph #: [phone] Route: Oral  
 diclofenac EC (VOLTAREN) 75 mg EC tablet 0 Sig: Take 1 Tab by mouth two (2) times a day. Class: Normal  
 Pharmacy: [facility] DR JUDY DAMIAN [address] [place] [address] Ph #: [phone] Route: Oral  
  
Follow-up Instructions Return in about 3 months (around 11/7/2018), or if symptoms worsen or fail to improve. To-Do List   
 08/07/2018 Imaging:  XR KNEE RT MIN 4 V Patient Instructions Knee Pain or Injury: Care Instructions Your Care Instructions Injuries are a common cause of knee problems. Sudden (acute) injuries may be caused by a direct blow to the knee.  They can also be caused by abnormal twisting, bending, or falling on the knee. Pain, bruising, or swelling may be severe, and may start within minutes of the injury. Overuse is another cause of knee pain. Other causes are climbing stairs, kneeling, and other activities that use the knee. Everyday wear and tear, especially as you get older, also can cause knee pain. Rest, along with home treatment, often relieves pain and allows your knee to heal. If you have a serious knee injury, you may need tests and treatment. Follow-up care is a key part of your treatment and safety. Be sure to make and go to all appointments, and call your doctor if you are having problems. It's also a good idea to know your test results and keep a list of the medicines you take. How can you care for yourself at home? · Be safe with medicines. Read and follow all instructions on the label. ¨ If the doctor gave you a prescription medicine for pain, take it as prescribed. ¨ If you are not taking a prescription pain medicine, ask your doctor if you can take an over-the-counter medicine. · Rest and protect your knee. Take a break from any activity that may cause pain. · Put ice or a cold pack on your knee for 10 to 20 minutes at a time. Put a thin cloth between the ice and your skin. · Prop up a sore knee on a pillow when you ice it or anytime you sit or lie down for the next 3 days. Try to keep it above the level of your heart. This will help reduce swelling. · If your knee is not swollen, you can put moist heat, a heating pad, or a warm cloth on your knee. · If your doctor recommends an elastic bandage, sleeve, or other type of support for your knee, wear it as directed. · Follow your doctor's instructions about how much weight you can put on your leg. Use a cane, crutches, or a walker as instructed. · Follow your doctor's instructions about activity during your healing process. If you can do mild exercise, slowly increase your activity. · Reach and stay at a healthy weight. Extra weight can strain the joints, especially the knees and hips, and make the pain worse. Losing even a few pounds may help. When should you call for help? Call 911 anytime you think you may need emergency care. For example, call if: 
  · You have symptoms of a blood clot in your lung (called a pulmonary embolism). These may include: 
¨ Sudden chest pain. ¨ Trouble breathing. ¨ Coughing up blood.  
 Call your doctor now or seek immediate medical care if: 
  · You have severe or increasing pain.  
  · Your leg or foot turns cold or changes color.  
  · You cannot stand or put weight on your knee.  
  · Your knee looks twisted or bent out of shape.  
  · You cannot move your knee.  
  · You have signs of infection, such as: 
¨ Increased pain, swelling, warmth, or redness. ¨ Red streaks leading from the knee. ¨ Pus draining from a place on your knee. ¨ A fever.  
  · You have signs of a blood clot in your leg (called a deep vein thrombosis), such as: 
¨ Pain in your calf, back of the knee, thigh, or groin. ¨ Redness and swelling in your leg or groin.  
 Watch closely for changes in your health, and be sure to contact your doctor if: 
  · You have tingling, weakness, or numbness in your knee.  
  · You have any new symptoms, such as swelling.  
  · You have bruises from a knee injury that last longer than 2 weeks.  
  · You do not get better as expected. Where can you learn more? Go to http://malachi-dustin.info/. Enter K195 in the search box to learn more about \"Knee Pain or Injury: Care Instructions. \" Current as of: November 20, 2017 Content Version: 11.7 © 7021-5310 [employer]. Care instructions adapted under license by [employer] (which disclaims liability or warranty for this information).  If you have questions about a medical condition or this instruction, always ask your healthcare professional. [employer] disclaims any warranty or liability for your use of this information. Introducing [facility]! Dear Kristine Philip: Thank you for requesting a [employer] account. Our records indicate that you already have an active [employer] account. You can access your account anytime at [email or web address] Did you know that you can access your hospital and ER discharge instructions at any time in [employer]? You can also review all of your test results from your hospital stay or ER visit. Additional Information If you have questions, please visit the Frequently Asked Questions section of the [employer] website at [email or web address] Remember, [employer] is NOT to be used for urgent needs. For medical emergencies, dial 911. Now available from your iPhone and Android! Please provide this summary of care documentation to your next provider. Your primary care clinician is listed as Maren Goldstein. If you have any questions after today's visit, please call [phone].

## 2018-08-07 NOTE — PROGRESS NOTES
Assessment/Plan:    Diagnoses and all orders for this visit:    1. Injury of right knee, initial encounter  -     diclofenac EC (VOLTAREN) 75 mg EC tablet; Take 1 Tab by mouth two (2) times a day. -     XR KNEE RT MIN 4 V; Future    2. Essential hypertension  -     isosorbide mononitrate ER (IMDUR) 30 mg tablet; Take 1 Tab by mouth daily. Follow-up Disposition:  Return in about 3 months (around 11/7/2018), or if symptoms worsen or fail to improve. CHAIM Reyes expressed understanding of this plan. An AVS was printed and given to the patient.      ----------------------------------------------------------------------    Chief Complaint   Patient presents with    Knee Injury     pt fell on the right knee heard a crack     Hypertension     f/u       History of Present Illness:  [place] the beach and fell onto her right knee and heard a crack and since then she has had constant pain, limits to range of motion, and she couldn't sleep due to the pain. She did not take any of her medication  She recently was discharged from  through [address] [place] [address] for her right shoulder/ neck pain. In case she needs to see orthopedics again for this injury, she really would like to be referred back to Abril Flood PA-C at [facility]. She is wearing a knee brace that is helping somewhat with her pain  She tried to ice the knee last night and she did not tolerate the ice. Past Medical History:   Diagnosis Date    Essential hypertension        Current Outpatient Prescriptions   Medication Sig Dispense Refill    isosorbide mononitrate ER (IMDUR) 30 mg tablet Take 1 Tab by mouth daily. 90 Tab 1    diclofenac EC (VOLTAREN) 75 mg EC tablet Take 1 Tab by mouth two (2) times a day.  60 Tab 0    lisinopril (PRINIVIL, ZESTRIL) 10 mg tablet TAKE ONE TABLET BY MOUTH ONCE DAILY 90 Tab 1    benzonatate (TESSALON) 100 mg capsule Take 1 Cap by mouth three (3) times daily as needed for Cough. 30 Cap 0    guaiFENesin ER (MUCINEX) 600 mg ER tablet Take 1 Tab by mouth two (2) times daily as needed for Congestion (cough). 60 Tab 0    predniSONE (DELTASONE) 10 mg tablet Take 4 tabs (40mg) x 5 days. Then take 3 tabs (30mg) x 3 days. Then take 2 tabs (20mg) x 3 days. Then take 1 tab (10mg) x 3 days. 40 Tab 0    acetaminophen-codeine (TYLENOL-CODEINE #3) 300-30 mg per tablet Take 1 Tab by mouth every six (6) hours as needed for Pain. Max Daily Amount: 4 Tabs. 30 Tab 0    raNITIdine (ZANTAC) 150 mg tablet Take 1 Tab by mouth two (2) times a day. 60 Tab 3       No Known Allergies    Social History   Substance Use Topics    Smoking status: Former Smoker     Quit date: 9/13/2004    Smokeless tobacco: Never Used    Alcohol use No       Family History   Problem Relation Age of Onset    Cancer Sister      breast    Breast Cancer Sister 71       Physical Exam:     Visit Vitals    /81 (BP 1 Location: Left arm, BP Patient Position: Sitting)    Pulse 91    Temp 98.7 °F (37.1 °C) (Oral)    Wt 134 lb (60.8 kg)    BMI 25.33 kg/m2       A&Ox3  WDWN NAD  Respirations normal and non labored  Knee exam- she has minimal swelling present cristine-patellar region but no redness or warmth. She has limited range of motion- full extension is painful.  Due to her pain, limited testing done for valgus/varus strain etc

## 2018-08-07 NOTE — PROGRESS NOTES
No fracture.  Please tell the pt to ice as we discussed, use pain meds as needed and f/up if not better or call and I will refer to ortho

## 2018-10-15 ENCOUNTER — HOSPITAL ENCOUNTER (OUTPATIENT)
Dept: GENERAL RADIOLOGY | Age: 63
Discharge: HOME | End: 2018-10-15
Payer: SUBSIDIZED

## 2018-10-15 ENCOUNTER — OFFICE VISIT (OUTPATIENT)
Dept: FAMILY MEDICINE CLINIC | Age: 63
End: 2018-10-15

## 2018-10-15 VITALS
DIASTOLIC BLOOD PRESSURE: 62 MMHG | BODY MASS INDEX: 25.07 KG/M2 | SYSTOLIC BLOOD PRESSURE: 104 MMHG | WEIGHT: 132.8 LBS | TEMPERATURE: 97.5 F | OXYGEN SATURATION: 100 % | HEIGHT: 61 IN | HEART RATE: 84 BPM

## 2018-10-15 DIAGNOSIS — R06.02 SHORTNESS OF BREATH: ICD-10-CM

## 2018-10-15 DIAGNOSIS — M17.11 OSTEOARTHRITIS OF RIGHT KNEE, UNSPECIFIED OSTEOARTHRITIS TYPE: ICD-10-CM

## 2018-10-15 DIAGNOSIS — M50.30 DDD (DEGENERATIVE DISC DISEASE), CERVICAL: ICD-10-CM

## 2018-10-15 DIAGNOSIS — M25.561 ACUTE PAIN OF RIGHT KNEE: ICD-10-CM

## 2018-10-15 DIAGNOSIS — R06.09 DYSPNEA ON EXERTION: Primary | ICD-10-CM

## 2018-10-15 PROCEDURE — 71046 X-RAY EXAM CHEST 2 VIEWS: CPT

## 2018-10-15 NOTE — PROGRESS NOTES
Brent Nelson is a 58 y.o. female Issues discussed today include: Chief Complaint Patient presents with  Knee Pain  
  right knee pain f/u  Breathing Problem  
  pt c/o SOB x2 weeks 1) R knee pain:  Doing better compared to 8/7 appt which was right after her injury. Has been icing and exercising. Doesn't hurt during the day or with activity, only at night if she leaves her knee straight. Does better in knee is bent at night. No swelling, redness. 2) Neck, arm pain:  Doing better with PT. Saw Ortho. Would like to continue more therapy, but she finished her sessions. 3) Dyspnea:  Started ~ 2 weeks ago. Occurs only in the early morning, feels like there is \"heaviness\" in R jaw and upper chest. Not usually associated with CP or chest pressure, but did have one episode of 3 short periods (15 seconds each) of L sided chest pain. Then she tells me this SOB also occurs during the day, with exertion. When she takes a takes a breath or yawns, she often feels better. Denies nasal congestion, cough, fever. No associated nausea, sweating, dizziness. Had stress test 4 yrs ago which was fine. Came from her [facility] with rx for imdur for a \"heart spasm\" and takes that daily. Has not run out of this med. Non smoker. Lipid panel in 3/2018 with . + h/o HTN. No DM. Data reviewed or ordered today:    
 
Other problems include: 
Patient Active Problem List  
Diagnosis Code  
 HTN (hypertension) I10  
 Allergic rhinitis J30.9 Medications: 
Current Outpatient Prescriptions Medication Sig Dispense Refill  isosorbide mononitrate ER (IMDUR) 30 mg tablet Take 1 Tab by mouth daily. 90 Tab 1  
 diclofenac EC (VOLTAREN) 75 mg EC tablet Take 1 Tab by mouth two (2) times a day. 60 Tab 0  
 lisinopril (PRINIVIL, ZESTRIL) 10 mg tablet TAKE ONE TABLET BY MOUTH ONCE DAILY 90 Tab 1  
 benzonatate (TESSALON) 100 mg capsule Take 1 Cap by mouth three (3) times daily as needed for Cough.  30 Cap 0  
  guaiFENesin ER (MUCINEX) 600 mg ER tablet Take 1 Tab by mouth two (2) times daily as needed for Congestion (cough). 60 Tab 0  predniSONE (DELTASONE) 10 mg tablet Take 4 tabs (40mg) x 5 days. Then take 3 tabs (30mg) x 3 days. Then take 2 tabs (20mg) x 3 days. Then take 1 tab (10mg) x 3 days. 40 Tab 0  
 acetaminophen-codeine (TYLENOL-CODEINE #3) 300-30 mg per tablet Take 1 Tab by mouth every six (6) hours as needed for Pain. Max Daily Amount: 4 Tabs. 30 Tab 0  
 raNITIdine (ZANTAC) 150 mg tablet Take 1 Tab by mouth two (2) times a day. 60 Tab 3 Allergies: Allergies Allergen Reactions  Beet Itching LMP:  No LMP recorded. Patient is postmenopausal. 
 
Social History Social History  Marital status:  Spouse name: N/A  
 Number of children: N/A  
 Years of education: N/A Occupational History  Not on file. Social History Main Topics  Smoking status: Former Smoker Quit date: 9/13/2004  Smokeless tobacco: Never Used  Alcohol use No  
 Drug use: No  
 Sexual activity: Not on file Other Topics Concern  Not on file Social History Narrative Family History Problem Relation Age of Onset  Cancer Sister   
  breast  
 Breast Cancer Sister 71 Physical Exam  
Visit Vitals  /62 (BP 1 Location: Right arm)  Pulse 84  Temp 97.5 °F (36.4 °C) (Oral)  Ht 5' 0.98\" (1.549 m)  Wt 132 lb 12.8 oz (60.2 kg)  SpO2 100%  BMI 25.11 kg/m2 BP Readings from Last 3 Encounters:  
10/15/18 104/62  
08/07/18 119/81  
03/26/18 127/88 Constitutional: Appears well,  No acute distress, Vitals noted Psychiatric:  Affect normal, Alert and Oriented to person/place/time Eyes:  Conjunctiva clear, no drainage ENT:  External ears and nose normal, Mucous membranes moist 
Neck:  General inspection normal. Supple. Heart:  Normal HR, Normal S1 and S2,  Regular rhythm. No murmurs, rubs or gallops. Lungs:  Clear to auscultation, good respiratory effort, no wheezes, rales or rhonchi Chest wall: Left upper chest wall mildly ttp, no deformity MSK: R knee without deformity or effusion, + medial joint line tenderness only, neg patellar grind test, no ligamentous laxity Extremities: Without edema, good peripheral pulses Skin:  Warm to palpation, without rashes Assessment/Plan: ICD-10-CM ICD-9-CM 1. Dyspnea on exertion R06.09 786.09   
2. Shortness of breath R06.02 786.05 XR CHEST PA LAT 3. Acute pain of right knee M25.561 719.46 REFERRAL TO PHYSICAL THERAPY 4. Osteoarthritis of right knee, unspecified osteoarthritis type M17.11 715.96 REFERRAL TO PHYSICAL THERAPY 5. DDD (degenerative disc disease), cervical M50.30 722.4 REFERRAL TO PHYSICAL THERAPY Dyspnea with waking up from lying down and walking, unclear etiology. - Will get CXR given multiple arthralgias to r/o pulmonary AI signs or bony, pleural source. - If negative and sxs persistent, I will order cardiac stress test. 
 
R knee pain s/p blunt trauma without effusion, + OA on Xray but no fx or subluxation. 
- Referral to PT for this and renewal for c spine arthritis and UE radiculopathy, sent to [facility] nurses Follow-up Disposition: 
Return for 6-8 weeks for f/u on knee pain, shortness of breath. Miah Amezquita MD 
[facility]

## 2018-10-15 NOTE — PATIENT INSTRUCTIONS
Viêm kh?p ??u g?i: Bài t?p - [ Knee Arthritis: Exercises ] H??ng D?n Ch?m Sóc 
? ây là m?t s? ví d? v? bài t?p cho viêm kh?p ??u g?i. B?t ??u m?i bài t?p t? t?. T?p nh? n?u quý v? b?t ??u b? ?au. Bác s? hay bác s? tr? li?u vât lý c?a quý v? s? cho quý v? bi?t khi nào quý v? có th? b?t ? ?u các bài t?p này và bài t?p nào s? có hi?u qu? nh?t cho quý v?. 
Cách th?c hành các bài t?p 
U?n ??u g?i tr??t gót chân 1. N?m ng?a và u?n ??u g?i. 
2. Tr??t gót chân v? phía devorah b?ng cách u?n ??u g?i b? ?au c?a quý v? xa nh?t kim ph?m vi có th?. Devorah ?ó móc chân còn l?i vào m?t cá chân ?? giúp kéo gót yuliana c?a quý v? xa h?n n?a. 
3. Gi? kim dionicio?ng 6 giây, devorah ?ó ngh? t?i ?a 10 giây. 4. L?p l?i 8 ??n 12 l?n. 
5. ??i chân và l?p l?i t? b??c 1 ??n b??c 4, ngay c? khi ch? có m?t ??u g?i b? ?au. Ép c? b?n ??u 1. Ng?i, ?? th?ng chân b? ?au ch?ng lên sàn ho?c gi??ng c?ng. ?? m?t chi?c kh?n nh?, cu?n l?i d??i ??u g?i c?a quý v?. U?n chân còn l?i, ?? bàn chân c?a chân ?ó áp th?ng lên sàn. 2. Th?t ch?t c? ?ùi c?a chân b? ?au b?ng cách ép ??ng devorah ??u g?i c?a quý v? vào kh?n. 
3. Gi? kim dionicio?ng 6 giây, devorah ?ó ngh? t?i ?a 10 giây. 4. L?p l?i 8 ??n 12 l?n. 
5. ??i chân và l?p l?i t? b??c 1 ??n b??c 4, ngay c? khi ch? có m?t ??u g?i b? ?au. Nâng th?ng chân v? phía tr??c 
 
1. N?m ng?a và co ??u g?i lành [place] cho bàn chân ?? áp vào sàn. ?? th?ng chân b? ?au c?a quý v?. ??m b?o r?ng l?ng d??i c?a quý v? noah bình th??ng. Quý v? ph?i có th? di zan?n bàn aubrie gi? a sàn và eo l?ng, v?i lòng bàn aubrie ch?m vào sàn và l?ng ch?m vào mu bàn aubrie. 2. Th?t ch?t c? ?ùi kim chân b? th??ng b?ng cách ép ph?n devorah c?a ??u g?i áp ph?ng martinez?ng sàn. Gi? th?ng ??u g?i. 
3. Gi? c? ?ùi th?t ch?t và th?ng chân, nâng chân b? ?au lên [place] cho gót chân cách sàn dionicio?ng 12 inch. Gi? kim dionicio?ng 6 giây, devorah ?ó t? t? h? martinez?ng. 4. Th? l?ng kim t?i ?a 10 giây gi? a các l?n l?p l?i. 
5. L?p l?i 8 ??n 12 l?n. 
 6. ??i chân và l?p l?i t? b??c 1 ??n b??c 5, ngay c? khi ch? có m?t ??u g?i b? ?au. U?n ??u g?i v?n ??ng 1. N?m s?p, ?? th?ng ??u g?i. N?u ??u g?i không tho?i mái, cu?n m?t chi?c kh?n và ??t d??i chân ngay trên ? ?u g?i c?a quý v?. 
2. Nâng bàn chân c?a chân b? ?au b?ng cách u?n ??u g?i [place] cho quý v? ??a bàn chân lên phía mông. N?u ??ng tác này làm quý v? ?au, hãy th? th?c hi?n mà không u?n ??u g?i solange?u nh? tr??c. ?i?u này có th? giúp quý v? tránh ? ??c b?t k? ??ng tác gây ?au ? ?n nào. 3. T? t? di zan?n chân c?a quý v? lên martinez?ng. 4. L?p l?i 8 ??n 12 l?n. 
5. ??i chân và l?p l?i t? b??c 1 ??n b??c 4, ngay c? khi ch? có m?t ??u g?i b? ?au. Du?i c? b?n ??u (úp m?t) 1. N?m s?p b?ng áp ph?ng, và ?? m?t quý v? trên sàn. 2. Qu?n kh?n ho?c th?t l?ng quanh ph?n d??i c?a chân b? ?au. Devorah ?ó dùng kh?n ho?c th?t l?ng ?? t? t? kéo gót chân v? phía mông ??n khi quý v? có c?m giác du?i. 
3. Gi? kim dionicio?ng 15 ? ?n 30 giây, devorah ?ó th? l?ng chân ch?ng vào kh?n ho?c th?t l?ng. 4. L?p l?i 2 ??n 4 l?n. 
5. ??i chân và l?p l?i t? b??c 1 ??n b??c 4, ngay c? khi ch? có m?t ??u g?i b? ?au. Xe ??p t?p th? d?c c? ??nh 
 
N?u quý v? không có xe ??p t?p th? d?c c? ??nh t?i nhà, quý v? có th? dùng xe ??p t?p th? d?c c? ??nh t?i câu l?c b? s?c kh?e ho?c yolanda tâm c?ng ? ?ng ??a ph??ng c?a mình. 1. ?i?u ch?nh chi?u rosario c?a yên xe ??p [place] cho ??u g?i h?i u?n khi chân ??p martinez?ng. N?u ??u g?i ?au khi bàn ? ?p lên ? ?n ?? rosario rosario nh?t, quý v? có th? nâng yên xe ??p [place] cho ??u g?i không b? u?n solange?u nh? tr??c. 
2. B?t ??u t? t?. Ban ??u, th? ??p xe 5 ? ?n 10 phút và không có ho?c có ít l?c c?n. Devorah ?ó t?ng th?i meli và l?c c?n t?ng chút m?t ??n khi quý v? có th? ??p xe 20 ? ?n 30 phút mà không b? ?au. 
3. N?u quý v? b?t ??u th?y ?au, ?? cho ??u g?i ngh? ng?i ?? n khi c?n ?au aditi l?i m?c ?? bình th??ng ??i v?i quý v?. Ho?c ?? p xe v?i th?i meli ít h?n ho?josse mendoza ít s?c h?n. 
 Ch?m sóc charly dõi là m?t ph?n francois tr?ng cho vi?c ?i?u tr? và s? an toàn c?a quý v?. ??m b?o s?p x?p và ??n t?t c? các bu?i h?n và g?i ?i?n cho bác s? n?u quý v? có v?n ?? Nanine Coombes v? c?ng nên bi?t k?t qu? xét benoit?m c?a mình và gi? l?i mihir sách các lo?i thu?c mà quý v? dùng. Quý v? có th? tìm hi?u thêm ? ?âu? Vào [email or web address] Enter [email or web address] tìm hi?u thêm kim hô?p ti?m kiê?m \"Viêm kh?p ??u g?i: Bài t?p - [ Knee Arthritis: Exercises ]. \" 
Baron Sarah hi?u l?c k? t?: Ngày 29 Chris?ng M???i Mô?t 2017 Phiên b?n n?i dun.8 © 1715-8944 [employer]wise[employer]. H??ng d?n ch?m sóc ? ??c s?a ??i cho phù h?p charly gi?y phép c?a chuyên elena ch?m sóc y t?. N?u quý v? có th??c m??c v? các ?i?u ki?n y t? ho?c h??ng d?n này, mandy vui lòng h?i chuyên elena ch?m so?c y t?. Tâ?p ?oa?n Healthwise kh??c t? m?i ??m b?o ho?c trách solange?m v? vi?c s? d?ng thông tin na?y. Delmon Dadds?: H???ng D?n Ch?m Sóc - [ Shortness of Breath: Care Instructions ] H??ng D?n Ch?m Sóc 
Khó th? có solange?u nguyên nhân. ?ôi khi ca?c ti?nh tr?ng bê?nh ch??ng ha?n nh? lo l?ng có th? d?n ??n khó th?. M?t s? ng??i bi? dionicio? th? nhe? khi t?p th? d?c. Johnny Ray th? c?ng có th? là m?t tri?u ch?ng c?a v?n ?? nghiêm tr?ng ch?ng h?n nh? hen jun?n, b?nh ph?i, b?nh khí th?ng, v?n ?? v? jenniffer và viêm ph?i. 
N?u sean? vi? khó th? liên t?c, quý v? có th? c?n làm ca?c xét benoit?m và ?i?u tr? Catherene Bimler charly dõi nh?ng thay ??i v? hô h?p và các tri?u ch?ng khác. Ch?m sóc charly dõi là m?t ph?n francois tr?ng cho vi?c ?i?u tr? và s? an toàn c?a quý v?. ??m b?o s?p x?p và ??n t?t c? các bu?i h?n và g?i ?i?n cho bác s? n?u quý v? có v?n ?? Nanine Coombes v? c?ng nên bi?t k?t qu? xét benoit?m c?a mình và gi? l?i mihir sách các lo?i thu?c mà quý v? dùng.  
Quý v? có th? ch?m sóc b?n thân t?i nhà th? nào? 
· ??ng hút thu?c hay cho phép ng??i khác hút thu?c ? g?n quý v?. N?u quý v? c?n giúp b? thu?c, hãy nói zan?n v?i bác s? c?a quý v? v? các lo?i thu?c và ch? ?ng trình b? thu?c. Nh?ng cách này có th? giúp quý v? d? b? h?n thu?c h?n. 
· Ngh? ng?i solange?u và ng? ?? gi?c. 
· Dùng thu?c gamaliel ? úng li?u ?ã ? ??c kê toa. G?i cho bác s? n?u quý v? ngh? mình ?ang có v?n ?? v?i thu?c. 
· Tìm nh?ng cách gi?i trí lành m?nh ?? gi?m b?t c?ng th?ng. ¨ T?p th? d?c hàng ngày. ¨ Ng? solange?u. 
¨ ?n ??u ??n và ?n ??y ?? Catheline Lints nào quý v? nên g?i tr? giúp? Hãy g?i 911 b?t c? lúc nào quý v? ngh? mình c?n ch?m sóc c?p c?u. Ví d?, hãy g?i n?u: 
· Althia Coup? vi? b? Andriette Finical th? n?ng. · Quý v? có các tri?u ch?ng ?au jenniffer. Các tri?u ch?ng này có th? g?m: 
¨ ?au ho?c t?c ng?c ho?c c?m giác l? kim ng?c. 
¨ ?? m? hôi. 
Marysol Islas th? Arash Issa Bu?n nôn ho?c ói m?a. 
¨ ?au, t?c ho?c c?m giác l? kim l?ng, c?, hàm ho?c b?ng trên ho?c kim m?t hay c? alis bên vai ho?c cánh aubrie. ¨ Choáng váng ho?c y?u ??t ng?t. 
Blase Orf ? ?p nhanh ho?c b?t th??ng. Devorah khi quý v? g?i 911, ng??i tr?c t?ng ?ài có th? yêu c?u quý v? nhai 1 viên aspirin có n?ng ? ? dành cho ng??i l?n ho?c 2 ??n 4 viên aspirin li?u th?p. Ch? xe c?u th??ng. Không c? g?ng t? lái xe. G?i bác s? c?a quý v? ngay ho?c tìm n?i ch?m sóc y t? ngay n?u: 
· Ch??ng khó th? c?a edis? vi? tr?? nên n?ng h?n ho?c edis? vi? b?t ??u th? khò khè. Th? khò khè là khi edis? vi? th?? kèm âm aaron the thé. · Althia Coup? vi? th?c d?y vào ban ?êm do không th?? ????c ho?c ph?i kê ??u trên vài chi?c g?i ?ê? th? . 
· Edis? vi? ch? bi? khó th? devorah khi ho?t ??ng nhe? ho?c kim lúc ngh? ng?i. Gamaliel dõi k? h?n các thay ??i v? s?c kh?e và nh? liên l?c v?i bác s? n?u: 
· Quý v? không th?y b?nh elena?m b??t kim 1 ?ê?n 2 rhys?y devorah ? o? Quinten Lorenzo Quý v? có th? tìm hi?u thêm ? ?âu? Vào [email or web address] Enter [email or web address] tìm hi?u thêm kim hô?p ti?m kiê?m \"Khó Th?: H???ng D?n Ch?m Sóc - [ Shortness of Breath: Care Instructions ]. \" 
Lysle Schaumann hi?u l?c k? t?: Ngày 6 Chris?ng M???i Alis 2017 Phiên b?n n?i dun.8 © 4071-4838 Healthwise, Incorporated. H??ng d?n ch?m sóc ? ??c s?a ??i cho phù h?p charly gi?y phép c?a chuyên elena ch?m sóc y t?. N?u quý v? có th??c m??c v? các ?i?u ki?n y t? ho?c h??ng d?n này, mandy decker lòng h?i corinn elena ch?m so?c y t?. Tâ?p ?oa?n Healthwise kh??c t? m?i ??m b?o ho?c trách solange?m v? vi?c s? d?ng thông tin na?y.

## 2018-10-15 NOTE — MR AVS SNAPSHOT
[address] [address] 
[phone] Patient: Marvel Auguste MRN: [id number] :1955 Visit Information Date & Time Provider Department Dept. Phone Encounter #  
 10/15/2018  8:50 AM Astrid Naranjo, [facility] [phone] [id number] Follow-up Instructions Return for 6-8 weeks for f/u on knee pain, shortness of breath. 2018 10:40 AM  
EWL OVER 50 with IVORY [facility] ([address]) [address] [facility] [address] [facility] [id number] [phone]  
  
   
 [address] [facility]   [facility] [address] [facility] Upcoming Health Maintenance Date Due DTaP/Tdap/Td series (1 - Tdap) 10/18/1976 Shingrix Vaccine Age 50> (1 of 2) 10/18/2005 FOBT Q 1 YEAR AGE 50-75 2017 PAP AKA CERVICAL CYTOLOGY 2018 Influenza Age 5 to Adult 2018 BREAST CANCER SCRN MAMMOGRAM 2019 Allergies as of 10/15/2018  Review Complete On: 3/26/2018 By: Guillermo Rao NP Severity Noted Reaction Type Reactions Beet  10/15/2018    Itching Current Immunizations  Reviewed on 2017 Name Date Influenza Vaccine (Quad) PF 2017, 2016, 10/22/2014 Not reviewed this visit You Were Diagnosed With   
  
 Codes Comments Dyspnea on exertion    -  Primary ICD-10-CM: R06.09 
ICD-9-CM: 786.09 Shortness of breath     ICD-10-CM: R06.02 
ICD-9-CM: 786.05 Acute pain of right knee     ICD-10-CM: M25.561 ICD-9-CM: 719.46 Osteoarthritis of right knee, unspecified osteoarthritis type     ICD-10-CM: M17.11 ICD-9-CM: 715.96   
 DDD (degenerative disc disease), cervical     ICD-10-CM: M50.30 ICD-9-CM: 722.4 Vitals BP Pulse Temp Height(growth percentile) Weight(growth percentile) SpO2  
 104/62 (BP 1 Location: Right arm) 84 97.5 °F (36.4 °C) (Oral) 5' 0.98\" (1.549 m) 132 lb 12.8 oz (60.2 kg) 100% BMI OB Status Smoking Status 25.11 kg/m2 Postmenopausal Former Smoker Vitals History BMI and BSA Data Body Mass Index Body Surface Area  
 25.11 kg/m 2 1.61 m 2 Preferred Pharmacy Pharmacy Name Phone [facility] [address] [phone] Your Updated Medication List  
  
   
This list is accurate as of 10/15/18  9:54 AM.  Always use your most recent med list.  
  
  
  
  
 acetaminophen-codeine 300-30 mg per tablet Commonly known as:  TYLENOL-CODEINE #3 Take 1 Tab by mouth every six (6) hours as needed for Pain. Max Daily Amount: 4 Tabs. benzonatate 100 mg capsule Commonly known as:  TESSALON Take 1 Cap by mouth three (3) times daily as needed for Cough. diclofenac EC 75 mg EC tablet Commonly known as:  VOLTAREN Take 1 Tab by mouth two (2) times a day. guaiFENesin  mg ER tablet Commonly known as:  Edward & Edward Take 1 Tab by mouth two (2) times daily as needed for Congestion (cough). isosorbide mononitrate ER 30 mg tablet Commonly known as:  IMDUR Take 1 Tab by mouth daily. lisinopril 10 mg tablet Commonly known as:  PRINIVIL, ZESTRIL  
TAKE ONE TABLET BY MOUTH ONCE DAILY predniSONE 10 mg tablet Commonly known as:  Laura Bricek Take 4 tabs (40mg) x 5 days. Then take 3 tabs (30mg) x 3 days. Then take 2 tabs (20mg) x 3 days. Then take 1 tab (10mg) x 3 days. raNITIdine 150 mg tablet Commonly known as:  ZANTAC Take 1 Tab by mouth two (2) times a day. We Performed the Following REFERRAL TO PHYSICAL THERAPY [[facility]] Comments:  
 Access Now Referral Request Form: 
 
Has the patient live in the area for 6 months Yes Is the patient here on a visa No 
 
Priority: 
 
3 weeks Location: [facility] Patient Demographics: 
 
Date:  10/15/2018                          EMR# [id number] Nestora Alert 1955 Home Phone : [phone]             Cell Phone:  same Language :  [place]algisa Specialist Requested:  PT 
 
Reason for Request:  R knee pain (Xray shows OA) and pt had a fall (Xr neg for acute injury); chronic neck/arm pain 2/2 cspine DDD Specialist Requirements: 
 
If GYN Referral:   
Pap: n/a If Ortho Referral: MRI n/a      
XRAY: n/a Pulmonary Referrals must have :  
C-X-ray n/a OR  
CT Scan n/a Referring Provider:  Tonette Goldberg, MD  
  
Follow-up Instructions Return for 6-8 weeks for f/u on knee pain, shortness of breath. To-Do List   
 10/15/2018 Imaging:  XR CHEST PA LAT Referral Information Referral ID Referred By Referred To  
  
 [id number] Adia AUGUST Not Available Visits Status Start Date End Date 1 New Request 10/15/18 10/15/19 If your referral has a status of pending review or denied, additional information will be sent to support the outcome of this decision. Patient Instructions Viêm kh?p ??u g?i: Bài t?p - [ Knee Arthritis: Exercises ] H??ng D?n Ch?m Sóc 
? ây là m?t s? ví d? v? bài t?p cho viêm kh?p ??u g?i. B?t ??u m?i bài t?p t? t?. T?p nh? n?u quý v? b?t ??u b? ?au. Bác s? hay bác s? tr? li?u vât lý c?a quý v? s? cho quý v? bi?t khi nào quý v? có th? b?t ? ?u các bài t?p này và bài t?p nào s? có hi?u qu? nh?t cho quý v?. 
Cách th?c hành các bài t?p 
U?n ??u g?i tr??t gót chân 1. N?m ng?a và u?n ??u g?i. 
2. Tr??t gót chân v? phía devorah b?ng cách u?n ??u g?i b? ?au c?a quý v? xa nh?t kim ph?m vi có th?. Devorah ?ó móc chân còn l?i vào m?t cá chân ?? giúp kéo gót chân c?a quý v? xa h?n n?a. 
3. Gi? kim dionicio?ng 6 giây, devorah ?ó ngh? t?i ?a 10 giây. 4. L?p l?i 8 ??n 12 l?n. 
5. ??i chân và l?p l?i t? b??c 1 ??n b??c 4, ngay c? khi ch? có m?t ??u g?i b? ?au. Ép c? b?n ??u 1. Ng?i, ?? th?ng chân b? ?au ch?ng lên sàn ho?c gi??ng c?ng.  ?? m?t chi?c kh?n nh?, cu?n l?i d??i ??u g?i c?a quý v?. U?n chân còn l?i, ?? bàn chân c?a chân ?ó áp th?ng lên sàn. 2. Th?t ch?t c? ?ùi c?a chân b? ?au b?ng cách ép ??ng devorah ??u g?i c?a quý v? vào kh?n. 
3. Gi? kim dionicio?ng 6 giây, devorah ?ó ngh? t?i ?a 10 giây. 4. L?p l?i 8 ??n 12 l?n. 
5. ??i chân và l?p l?i t? b??c 1 ??n b??c 4, ngay c? khi ch? có m?t ??u g?i b? ?au. Nâng th?ng chân v? phía tr??c 
 
1. N?m ng?a và co ??u g?i lành [place] cho bàn chân ?? áp vào sàn. ?? th?ng chân b? ?au c?a quý v?. ??m b?o r?ng l?ng d??i c?a quý v? noah bình th??ng. Quý v? ph?i có th? di zan?n bàn aubrie gi? a sàn và eo l?ng, v?i lòng bàn aubrie ch?m vào sàn và l?ng ch?m vào mu bàn aubrie. 2. Th?t ch?t c? ?ùi kim chân b? th??ng b?ng cách ép ph?n devorah c?a ??u g?i áp ph?ng martinez?ng sàn. Gi? th?ng ??u g?i. 
3. Gi? c? ?ùi th?t ch?t và th?ng chân, [place] b? ?au lên [place] cho gót chân cách sàn dionicio?ng 12 inch. Gi? kim dionicio?ng 6 giây, devorah ?ó t? t? h? martinez?ng. 4. Th? l?ng kim t?i ?a 10 giây gi? a các l?n l?p l?i. 
5. L?p l?i 8 ??n 12 l?n. 
6. ??i chân và l?p l?i t? b??c 1 ??n b??c 5, ngay c? khi ch? có m?t ??u g?i b? ?au. U?n ??u g?i v?n ??ng 1. N?m s?p, ?? th?ng ??u g?i. N?u ??u g?i không tho?i mái, cu?n m?t chi?c kh?n và ??t d??i chân ngay trên ? ?u g?i c?a quý v?. 
2. Nâng [place] bridges c?a chân b? ?au b?ng cách u?n ??u g?i [place] cho quý v? ??a bàn chân lên phía mông. N?u ??ng tác này làm quý v? ?au, hãy th? th?c hi?n mà không u?n ??u g?i solange?u nh? tr??c. ?i?u này có th? giúp quý v? tránh ? ??c b?t k? ??ng tác gây ?au ? ?n nào. 3. T? t? di zan?n chân c?a quý v? lên martinez?ng. 4. L?p l?i 8 ??n 12 l?n. 
5. ??i chân và l?p l?i t? b??c 1 ??n b??c 4, ngay c? khi ch? có m?t ??u g?i b? ?au. Du?i c? b?n ??u (úp m?t) 1. N?m s?p b?ng áp ph?ng, và ?? m?t quý v? trên sàn. 2. Qu?n kh?n ho?c th?t l?ng quanh ph?n d??i c?a chân b? ?au.  Devorah ?ó dùng kh?n ho?c th?t l?ng ?? t? t? kéo gót chân v? phía mông ??n ky panchal v? có c?m debbi koch?i. 
 3. Gi? kim dionicio?ng 15 ? ?n 30 giây, devorah ?ó th? l?ng chân ch?ng vào kh?n ho?c th?t l?ng. 4. L?p l?i 2 ??n 4 l?n. 
5. ??i chân và l?p l?i t? b??c 1 ??n b??c 4, ngay c? khi ch? có m?t ??u g?i b? ?au. Xe ??p t?p th? d?c c? ??nh 
 
N?u quý v? không có xe ??p t?p th? d?c c? ??nh t?i nhà, quý v? có th? dùng xe ??p t?p th? d?c c? ??nh t?i câu l?c b? s?c kh?e ho?c yolanda tâm c?ng ? ?ng ??a ph??ng c?a mình. 1. ?i?u ch?nh chi?u rosario c?a yên xe ??p [place] cho ??u g?i h?i u?n khi chân ??p martinez?ng. N?u ??u g?i ?au khi bàn ? ?p lên ? ?n ?? rosario rosario nh?t, quý v? có th? nâng yên xe ??p [place] cho ??u g?i không b? u?n solange?u nh? tr??c. 
2. B?t ??u t? t?. Ban ??u, th? ??p xe 5 ? ?n 10 phút và không có ho?c có ít l?c c?n. Devorah ?ó t?ng th?i meli và l?c c?n t?ng chút m?t ??n khi quý v? có th? ??p xe 20 ? ?n 30 phút mà không b? ?au. 
3. N?u quý v? b?t ??u th?y ?au, ?? cho ??u g?i ngh? ng?i ?? n khi c?n ?au aditi l?i m?c ?? bình th??ng ??i v?i quý v?. Ho?c ?? p xe v?i th?i meli ít h?n ho?c dùng ít s?c h?n. 
Ch?m sóc charly dõi là m?t ph?n francois tr?ng cho vi?c ?i?u tr? và s? an toàn c?a quý v?. ??m b?o s?p x?p và ??n t?t c? các bu?i h?n và g?i ?i?n cho bác s? n?u quý v? có v?n ?? Arletta Ligas v? c?ng nên bi?t k?t qu? xét benoit?m c?a mình và gi? l?i mihir sách các lo?i thu?c mà quý v? dùng. Quý v? có th? tìm hi?u thêm ? ?âu? Vào [email or web address] Enter C159 tìm hi?u thêm kim hô?p ti?m kiê?m \"Viêm kh?p ??u g?i: Bài t?p - [ Knee Arthritis: Exercises ]. \" 
Marval Belt hi?u l?c k? t?: Ngày 29 Chris?ng M???i Mô?t 2017 Phiên b?n n?i corrine.8 © 4510-5918 [employer]. H??ng d?n ch?m sóc ? ??c s?a ??i cho phù h?p charly gi?y phép c?a chuyên elena ch?m sóc y t?. N?u quý v? có th??c m??c v? các ?i?u ki?n y t? ho?c h??ng d?n nànolan, mandy prajapatii lòng h?i chuyên elena ch?m so?c y t?. Tâ?p ?oa?n [employer]wise kh??c t? m?i ??m b?o ho?c trách solange?m v? vi?c s? d?ng thông tin na?y. Brandy De La Fuente?: H???ng D?n Ch?m Sóc - [ Shortness of Breath: Care Instructions ] H?? ng D?n Ch?m Sóc 
Khó th? có solange?u nguyên nhân. ?ôi khi ca?c ti?nh tr?ng bê?nh ch??ng ha?n nh? lo l?ng có th? d?n ??n khó th?. M?t s? ng??i bi? dionicio? th? nhe? khi t?p th? d?c. Kayla Barbone th? c?ng có th? là m?t tri?u ch?ng c?a v?n ?? nghiêm tr?ng ch?ng h?n nh? hen jun?n, b?nh ph?i, b?nh khí th?ng, v?n ?? v? jenniffer và viêm ph?i. 
N?u sean? vi? khó th? liên t?c, quý v? có th? c?n làm ca?c xét benoit?m và ?i?u tr? Romulo Knowles charly dõi nh?ng thay ??i v? hô h?p và các tri?u ch?ng khác. Ch?m sóc charly dõi là m?t ph?n francois tr?ng cho vi?c ?i?u tr? và s? an toàn c?a quý v?. ??m b?o s?p x?p và ??n t?t c? các bu?i h?n và g?i ?i?n cho bác s? n?u quý v? có v?n ?? Arletta Ligas v? c?ng nên bi?t k?t qu? xét benoit?m c?a mình và gi? l?i mihir sách các lo?i thu?c mà quý v? dùng. Quý v? có th? ch?m sóc b?n thân t?i nhà th? nào? 
· ??ng hút thu?c hay cho phép ng??i khác hút thu?c ? g?n quý v?. N?u quý v? c?n giúp b? thu?c, hãy nói zan?n v?i bác s? c?a quý v? v? các lo?i thu?c và ch? ?ng trình b? thu?c. Nh?ng cách này có th? giúp quý v? d? b? h?n thu?c h?n. 
· Ngh? ng?i solange?u và ng? ?? gi?c. 
· Dùng thu?c charly ? úng li?u ?ã ? ??c kê toa. G?i cho bác s? n?u quý v? ngh? mình ?ang có v?n ?? v?i thu?c. 
· Tìm nh?ng cách gi?i trí lành m?nh ?? gi?m b?t c?ng th?ng. ¨ T?p th? d?c hàng ngày. ¨ Ng? solange?u. 
¨ ?n ??u ??n và ?n ??y ?? Rubbie Teresa nào quý v? nên g?i tr? giúp? Hãy g?i 911 b?t c? lúc nào quý v? ngh? mình c?n ch?m sóc c?p c?u. Ví d?, hãy g?i n?u: 
· Diane Priestly? vi? b? Dicky Araceli th? n?ng. · Quý v? có các tri?u ch?ng ?au jenniffer. Các tri?u ch?ng này có th? g?m: 
¨ ?au ho?c t?c ng?c ho?c c?m giác l? kim ng?c. 
¨ ?? m? hôi. 
Kirby Lukes th? Grisel Imam Bu?n nôn ho?c ói m?a. 
¨ ?au, t?c ho?c c?m giác l? kim l?ng, c?, hàm ho?c b?ng trên ho?c kim m?t hay c? alis bên vai ho?c cánh aubrie. ¨ Choáng váng ho?c y?u ??t ng?t. 
Julious Brace ? ?p nhanh ho?c b?t th??ng. Devorah khi quý v? g?i 911, ng??i tr?c t?ng ?ài có th? yêu c?u quý v? nhai 1 viên aspirin có n?ng ? ? alison cho ng??i l?n ho?c 2 ??n 4 viên aspirin li?u th?p. Ch? xe c?u th??ng. Không c? g?ng t? lái xe. G?i bác s? c?a quý v? ngay ho?c tìm n?i ch?m sóc y t? ngay n?u: 
· Ch??ng khó th? c?a edis? vi? tr?? nên n?ng h?n ho?c edis? vi? b?t ??u th? khò khè. Th? khò khè là khi edis? vi? th?? kèm âm aaron the thé. · [place]? vi? th?c d?y vào ban ?êm do không th?? ????c ho?c ph?i kê ??u trên vài chi?c g?i ?ê? th? . 
· Edis? vi? ch? bi? khó th? violetta khi ho?t ??ng nhe? ho?c kim lúc ngh? ng?i. Gamaliel dõi k? h?n các thay ??i v? s?c kh?e và nh? liên l?c v?i bác s? n?u: 
· Quý v? không th?y b?nh elena?m b??t kim 1 ?ê?n 2 rhys?y violetta ? o? Felicia Shaq Quý v? có th? tìm hi?u thêm ? ?âu? Vào [email or web address] Enter [email or web address] tìm hi?u thêm kim hô?p ti?m kiê?m \"Khó Th?: H???ng D?n Ch?m Sóc - [ Shortness of Breath: Care Instructions ]. \" 
Rosea Sack hi?u l?c k? t?: Ngày 6 Chris?ng M???i Edouard 2017 Phiên b?n n?ole heaton: 11.8 © 3789-8520 [employer]. H??ng d?n ch?m sóc ? ??c s?a ??i cho phù h?p gamaliel gi?y phép c?a chuyên elena ch?m sóc y t?. N?u quý v? có th??c m??c v? các ?i?u ki?n y t? ho?c h??ng d?n này, mandy prajapatii lòng h?i lore parker ch?m so?c y t?. Tâ?p ?oa?n [employer]wise kh??c t? m?i ??m b?o ho?c trách solange?m v? vi?c s? d?ng thông tin na?y. Introducing [facility] SERVICES! Dear Rodríguez Foster: Thank you for requesting a [employer] account. Our records indicate that you already have an active [employer] account. You can access your account anytime at [email or web address] Did you know that you can access your hospital and ER discharge instructions at any time in [employer]? You can also review all of your test results from your hospital stay or ER visit. Additional Information If you have questions, please visit the Frequently Asked Questions section of the [employer] website at [email or web address] Remember, [employer] is NOT to be used for urgent needs. For medical emergencies, dial 911. Now available from your iPhone and Android! Please provide this summary of care documentation to your next provider. Your primary care clinician is listed as Bharathi Goldstein. If you have any questions after today's visit, please call [phone].

## 2018-10-15 NOTE — PROGRESS NOTES
Coordination of Care 1. Have you been to the ER, urgent care clinic since your last visit? Hospitalized since your last visit? No 
 
2. Have you seen or consulted any other health care providers outside of the [address] [place] [address] since your last visit? Include any pap smears or colon screening. No 
 
Does the patient need refills? NO Learning Assessment Complete? yes

## 2018-10-17 DIAGNOSIS — R07.9 LEFT SIDED CHEST PAIN: ICD-10-CM

## 2018-10-17 DIAGNOSIS — R06.09 DYSPNEA ON EXERTION: Primary | ICD-10-CM

## 2018-10-17 NOTE — PROGRESS NOTES
CXR is normal, no explanation found for pt's SOB or Left chest wall pain  She should proceed with stress testing, I have ordered this and pt should hear from central scheduling to make appt  Routing to RN to inform the pt

## 2018-11-07 ENCOUNTER — OFFICE VISIT (OUTPATIENT)
Dept: FAMILY PLANNING/WOMEN'S HEALTH CLINIC | Age: 63
End: 2018-11-07

## 2018-11-07 ENCOUNTER — HOSPITAL ENCOUNTER (OUTPATIENT)
Dept: MAMMOGRAPHY | Age: 63
Discharge: HOME | End: 2018-11-07

## 2018-11-07 ENCOUNTER — DOCUMENTATION ONLY (OUTPATIENT)
Dept: FAMILY PLANNING/WOMEN'S HEALTH CLINIC | Age: 63
End: 2018-11-07

## 2018-11-07 ENCOUNTER — HOSPITAL ENCOUNTER (OUTPATIENT)
Dept: LAB | Age: 63
Discharge: HOME | End: 2018-11-07

## 2018-11-07 VITALS — DIASTOLIC BLOOD PRESSURE: 75 MMHG | SYSTOLIC BLOOD PRESSURE: 117 MMHG

## 2018-11-07 DIAGNOSIS — Z01.419 WELL WOMAN EXAM WITH ROUTINE GYNECOLOGICAL EXAM: Primary | ICD-10-CM

## 2018-11-07 DIAGNOSIS — Z12.31 VISIT FOR SCREENING MAMMOGRAM: ICD-10-CM

## 2018-11-07 PROCEDURE — 88175 CYTOPATH C/V AUTO FLUID REDO: CPT

## 2018-11-07 PROCEDURE — 77067 SCR MAMMO BI INCL CAD: CPT

## 2018-11-07 NOTE — PROGRESS NOTES
[facility]ERY [facility]OM[facility] HISTORY QUESTIONNAIRE       No Yes Comments   Has a doctor ever seen or felt anything wrong with your breast? [x]                                  []                                     Have you ever had a breast biopsy? [x]                                  []                                          When and where was last mammogram performed? 9/2017 with [facility]     Have you ever been told that there was a problem on your mammogram?   No Yes Comments   [x]                                  []                                       Do you have breast implants? No Yes Comments   [x]                                  []                                       When was your last Pap test performed? 2015    Have you ever had an abnormal Pap test?   No Yes Comments   [x]                                  []                                  Possibly some surgery on cervix in the past, but unsure     Have you had a hysterectomy? No Yes Comments (why)   [x]                                  []                                       Have you ever been diagnosed with any type of Cancer   No Yes Comments (type,when,where,type of treatment   [x]                                  []                                          Has a family member been diagnosed with breast or ovarian cancer? No Yes Comments (which family members, and type   []                                  [x]                                  Older sister with breast cancer     Did your mother take CLAUDIA? No Yes Unknown   [x]                                  []                                       Do you have a history of HIV exposure? No Yes    [x]                                  []                                         Have you been through menopause?    No Yes Date of LMP   []                                  [x]                                  More than 10 yrs ago     Are you taking hormone replacement therapy (HRT)     No Yes Comments   [x] []                                       How many times have you been pregnant? 7       Number of live births ? 6    Are you experiencing any of the following? No Yes Comments   Nipple Discharge [x]                                  []                                     Breast Lump/Masses [x]                                  []                                     Breast Skin Changes [x]                                  []                                          No Yes Comments   Vaginal Discharge [x]                                  []                                  Some odor after working   Abnormal/unusual vaginal bleeding [x]                                  []                                         Are you experiencing any other health problems?     Arthritis, recent SOB---followed at [facility]

## 2018-11-07 NOTE — PROGRESS NOTES
Assessment/Plan:    [facility] not working so this note is not linked with her office visit today  [facility] today    Follow-up Disposition: Not on File    [facility] understanding [facility]. An AVS was printed and given to the patient.      ----------------------------------------------------------------------    No chief complaint on file. History of Present Illness:    Menopausal, no specific complaints  Does SBE  Had colonoscopy in the past 1-2 years and it was normal she states, she declines rectal exam today  Last pap   Here with her daughter who is translating  All pregnancies delivered vaginally    Past Medical History:   Diagnosis Date    Essential hypertension        Current Outpatient Medications   Medication Sig Dispense Refill    isosorbide mononitrate ER (IMDUR) 30 mg tablet Take 1 Tab by mouth daily. 90 Tab 1    diclofenac EC (VOLTAREN) 75 mg EC tablet Take 1 Tab by mouth two (2) times a day. 60 Tab 0    lisinopril (PRINIVIL, ZESTRIL) 10 mg tablet TAKE ONE TABLET BY MOUTH ONCE DAILY 90 Tab 1    benzonatate (TESSALON) 100 mg capsule Take 1 Cap by mouth three (3) times daily as needed for Cough. 30 Cap 0    guaiFENesin ER (MUCINEX) 600 mg ER tablet Take 1 Tab by mouth two (2) times daily as needed for Congestion (cough). 60 Tab 0    predniSONE (DELTASONE) 10 mg tablet Take 4 tabs (40mg) x 5 days. Then take 3 tabs (30mg) x 3 days. Then take 2 tabs (20mg) x 3 days. Then take 1 tab (10mg) x 3 days. 40 Tab 0    acetaminophen-codeine (TYLENOL-CODEINE #3) 300-30 mg per tablet Take 1 Tab by mouth every six (6) hours as needed for Pain. Max Daily Amount: 4 Tabs. 30 Tab 0    raNITIdine (ZANTAC) 150 mg tablet Take 1 Tab by mouth two (2) times a day.  60 Tab 3       Allergies   Allergen Reactions    Beet Itching       Social History     Tobacco Use    Smoking status: Former Smoker     Last attempt to quit: 2004     Years since quitting: 14.1    Smokeless tobacco: Never Used   Substance Use Topics    Alcohol use: No     Alcohol/week: 0.0 oz    Drug use: No       Family History   Problem Relation Age of Onset    Cancer Sister         breast    Breast Cancer Sister 71       Physical Exam:     There were no vitals taken for this visit.     A&Ox3  WDWN NAD  Respirations normal and non labored  Breast exam-doris neg for mass, tenderness, skin color changes, dimpling, retractions, nipple changes  Pelvic exam- moderate to severe postmenopausal atrophy, stenotic os, which could make the pap sampling insufficient, uterus and adnexal exam w/out mass or lesion  Rectal deferred

## 2018-11-09 NOTE — PROGRESS NOTES
Telephoned patient to mobile number daughter King Page on [facility] answered the phone. Result note from Dr. Aldair Saavedra given to patient's daughter over phone. This has been fully explained to the patient, who indicates understanding. Stress test had not been schedule as of yet, per daughter they had not received phone call about it, nurse called [address] service about it, they have now call patient's daughter, no appt. Made yet but daughter is planning to call back soon to make stress test appointment.

## 2018-11-19 ENCOUNTER — HOSPITAL ENCOUNTER (OUTPATIENT)
Dept: LAB | Age: 63
Discharge: HOME | End: 2018-11-19

## 2018-11-19 ENCOUNTER — OFFICE VISIT (OUTPATIENT)
Dept: FAMILY MEDICINE CLINIC | Age: 63
End: 2018-11-19

## 2018-11-19 VITALS
DIASTOLIC BLOOD PRESSURE: 81 MMHG | SYSTOLIC BLOOD PRESSURE: 147 MMHG | TEMPERATURE: 98.2 F | BODY MASS INDEX: 25.14 KG/M2 | HEART RATE: 84 BPM | WEIGHT: 133 LBS

## 2018-11-19 DIAGNOSIS — I10 ESSENTIAL HYPERTENSION: Primary | ICD-10-CM

## 2018-11-19 DIAGNOSIS — M17.11 OSTEOARTHRITIS OF RIGHT KNEE, UNSPECIFIED OSTEOARTHRITIS TYPE: ICD-10-CM

## 2018-11-19 DIAGNOSIS — R06.02 SHORTNESS OF BREATH: ICD-10-CM

## 2018-11-19 DIAGNOSIS — I10 ESSENTIAL HYPERTENSION: ICD-10-CM

## 2018-11-19 DIAGNOSIS — R10.12 LEFT UPPER QUADRANT PAIN: ICD-10-CM

## 2018-11-19 LAB
ANION GAP SERPL CALC-SCNC: 5 MMOL/L (ref 5–15)
BUN SERPL-MCNC: 9 MG/DL (ref 6–20)
BUN/CREAT SERPL: 15 (ref 12–20)
CALCIUM SERPL-MCNC: 8.6 MG/DL (ref 8.5–10.1)
CHLORIDE SERPL-SCNC: 103 MMOL/L (ref 97–108)
CO2 SERPL-SCNC: 26 MMOL/L (ref 21–32)
CREAT SERPL-MCNC: 0.6 MG/DL (ref 0.55–1.02)
GLUCOSE SERPL-MCNC: 95 MG/DL (ref 65–100)
POTASSIUM SERPL-SCNC: 3.7 MMOL/L (ref 3.5–5.1)
SODIUM SERPL-SCNC: 134 MMOL/L (ref 136–145)

## 2018-11-19 PROCEDURE — 80048 BASIC METABOLIC PNL TOTAL CA: CPT

## 2018-11-19 RX ORDER — LISINOPRIL 10 MG/1
TABLET ORAL
Qty: 90 TAB | Refills: 1 | Status: SHIPPED | OUTPATIENT
Start: 2018-11-19

## 2018-11-19 RX ORDER — RANITIDINE 150 MG/1
150 TABLET, FILM COATED ORAL 2 TIMES DAILY
Qty: 180 TAB | Refills: 1 | Status: SHIPPED | OUTPATIENT
Start: 2018-11-19

## 2018-11-19 RX ORDER — ISOSORBIDE MONONITRATE 30 MG/1
30 TABLET, EXTENDED RELEASE ORAL DAILY
Qty: 90 TAB | Refills: 1 | Status: SHIPPED | OUTPATIENT
Start: 2018-11-19

## 2018-11-19 NOTE — PROGRESS NOTES
[address] [place] [address]   Office [phone], Fax [phone]    Subjective:   Te Marshall is a 61 y.o. female   CC: Follow up  History provided by patient     HPI:  64yoF here for 6 weeks F/U. She is currently doing well and she does not have any pressing concerns today. She will be traveling to [facility] this upcoming Monday and she wanted to make sure that everything is fine. She was asked to get medical records if possible, especially regarding Imdur. SOB:   -CXR: no acute abnormalities. No pneumonia.   -Former smoker for 6yr in [id number]. She lost her  in 1997 and smoked 1ppd but reduced to 0.5ppd a year later.    -Denies any coughing, sob, or difficulties breathing today. No chest pain. She does not exercise regularly.  -Stress test: tomorrow 8am. Had ECHO TTE Stress exercise treadmill  previously 11/2014. That had normal resting, but post exervise with uniformly enhanced myocardial contractility with decreased LV cavity size. She stated that she felt that it was difficult to catch her breath after the test but fine during the test.     HTN:   Meds; Imdur 30mg (since 2008) and lisinopril 10mg  BPs: Does not regularly check. Does not have headache. Diet:Low salt diet. Plenty of vegetables with less meat. Right Knee Pain:   -Pain is much improved. She does not use walker or cane. She can ambulate independently. Past Medical History:   Diagnosis Date    Essential hypertension      Allergies   Allergen Reactions    Beet Itching     Current Outpatient Medications on File Prior to Visit   Medication Sig Dispense Refill    diclofenac EC (VOLTAREN) 75 mg EC tablet Take 1 Tab by mouth two (2) times a day. 60 Tab 0    acetaminophen-codeine (TYLENOL-CODEINE #3) 300-30 mg per tablet Take 1 Tab by mouth every six (6) hours as needed for Pain. Max Daily Amount: 4 Tabs. 30 Tab 0     No current facility-administered medications on file prior to visit.       Family History Problem Relation Age of Onset    Cancer Sister         breast    Breast Cancer Sister 71     Social History     Socioeconomic History    Marital status:      Spouse name: Not on file    Number of children: Not on file    Years of education: Not on file    Highest education level: Not on file   Social Needs    Financial resource strain: Not on file    Food insecurity - worry: Not on file    Food insecurity - inability: Not on file   Fuzz needs - medical: Not on file   Fuzz needs - non-medical: Not on file   Occupational History    Not on file   Tobacco Use    Smoking status: Former Smoker     Last attempt to quit: 2004     Years since quittin.1    Smokeless tobacco: Never Used   Substance and Sexual Activity    Alcohol use: No     Alcohol/week: 0.0 oz    Drug use: No    Sexual activity: Not on file   Other Topics Concern    Not on file   Social History Narrative    Not on file     No past surgical history on file. Patient Active Problem List   Diagnosis Code    HTN (hypertension) I10    Allergic rhinitis J30.9           Review of Systems   Constitutional: Negative for chills and fever. Eyes: Negative for blurred vision. Respiratory: Negative for cough, sputum production, shortness of breath and wheezing. Cardiovascular: Negative for chest pain. Gastrointestinal: Negative for abdominal pain. Skin: Negative for rash. Neurological: Negative for headaches. Objective:     Visit Vitals  /81 (BP 1 Location: Right arm, BP Patient Position: Sitting)   Pulse 84   Temp 98.2 °F (36.8 °C) (Oral)   Wt 133 lb (60.3 kg)   BMI 25.14 kg/m²        Physical Exam   Constitutional: She appears well-developed and well-nourished. No distress. HENT:   Head: Normocephalic and atraumatic. Eyes: Conjunctivae are normal. No scleral icterus. Cardiovascular: Normal rate, regular rhythm, normal heart sounds and intact distal pulses.    No murmur heard.  Pulmonary/Chest: Breath sounds normal. No respiratory distress. She has no wheezes. She has no rales. Musculoskeletal:   R knee has ROM. No erythema or effusion. No crepitus. Pertinent Labs/Studies: Personally reviewed chest x-ray. Assessment and orders:     Maximino Alfaro is a 61 y.o.  female presents with for 6 week follow up for HTN, SOB, and R knee pain. 1. Essential hypertension  - Takes her medicine regular. No headaches or vision changes. She denies any side effects.   - lisinopril (PRINIVIL, ZESTRIL) 10 mg tablet; TAKE ONE TABLET BY MOUTH ONCE DAILY  Dispense: 90 Tab; Refill: 1  - isosorbide mononitrate ER (IMDUR) 30 mg tablet; Take 1 Tab by mouth daily. Dispense: 90 Tab; Refill: 1  - METABOLIC PANEL, BASIC; Future    2. Shortness of breath  - Improved. She does not have SOB at rest.   - Scheduled for nuclear stress test tomorrow    3. Osteoarthritis of right knee, unspecified osteoarthritis type  - Pain is much improved from last visit. She does not have any mobility issues today. Has some pain with prolonged standing  -Recommends moderate physical exercise     4.. Left upper quadrant pain  - Chronic. She mentions that her stomach acts up if she does not take her Zantac regularly. Will refill prescription today. - raNITIdine (ZANTAC) 150 mg tablet; Take 1 Tab by mouth two (2) times a day. Dispense: 180 Tab; Refill: 1    Follow up: as needed. I have reviewed patient medical and social history and medications. I have reviewed pertinent labs results and other data. I have discussed the diagnosis with the patient and the intended plan as seen in the above orders. The patient has received an after-visit summary and questions were answered concerning future plans. I have discussed medication side effects and warnings with the patient as well.     Patient discussed and seen with Dr. Mariaa Gross, Attending Physician    Roman Brown MD  [id number] Resident  11/19/18

## 2018-11-19 NOTE — PROGRESS NOTES
Coordination of Care  1. Have you been to the ER, urgent care clinic since your last visit? Hospitalized since your last visit? No    2. Have you seen or consulted any other health care providers outside of the [address] [place] [address] since your last visit? Include any pap smears or colon screening. No    Does the patient need refills? YES    Learning Assessment Complete?  yes

## 2018-11-19 NOTE — PATIENT INSTRUCTIONS
Chavez Dilip?t Áp: H???ng D?n Ch?m Sóc - [ High Blood Pressure: Care Instructions ]  H??ng D?n Ch?m Sóc  N?u dilip?t áp c?a quý v? th??ng ? m?c trên 140/90, quý v? b? chavez dilip?t áp ho?c ch?ng t?ng dilip?t áp. M?c này cho bi?t s? chavez nh?t là t? 140 tr? lên ho?c s? th?p nh?t là t? 90 tr? lên ho?c c? alis. Trái v?i nh?ng gì solange?u ng??i v?n ngh?, chavez dilip?t áp th??ng không gây nh?c ??u ho?c làm quý v? chóng m?t hay choáng váng. Chavez dilip?t áp th??ng không có tri?u ch?ng gì. Nh?ng chavez dilip?t áp làm t?ng nguy c? ?au jenniffer, ??t qu? và t?n th??ng th?n ho?c m?t. Dilip?t áp càng chavez, nguy c? m?c các b?nh này càng t?ng. Bác s? s? cho quý v? bi?t m?c tiêu dành cho dilip?t áp c?a quý v?. M?c tiêu c?a quý v? s? d?a trên s?c dionicio? và tu?i c?a quý v?. M?t ví d? v? m?c tiêu là easton trì dilip?t áp ? m?c d??i 140/90. Thay ??i l?i s?ng, ví d? nh? ?n u?ng có l?i cho s?c kh?e và n?ng ? ?ng, luôn francois tr?ng ? ? giúp gi?m dilip?t áp. Quý v? c?ng có th? u?ng thu?c ?? ??t m?c tiêu dilip?t áp. Ch?m sóc charly dõi là m?t ph?n francois tr?ng cho vi?c ?i?u tr? và s? an toàn c?a quý v?. ??m b?o s?p x?p và ??n t?t c? các bu?i h?n và g?i ?i?n cho bác s? n?u quý v? có v?n ?? Arletta Ligas v? c?ng nên bi?t k?t qu? xét benoit?m c?a mình và gi? l?i mihir sách các lo?i thu?c mà quý v? dùng. Quý v? có th? ch?m sóc b?n thân t?i nhà th? nào?  ?i?u tr? y t?  · N?u quý v? ng?ng u?ng thu?c, dilip?t áp s? t?ng chavez tr? l?i. Quý v? có th? dùng m?t ho?c solange?u lo?i thu?c ?? h? dilip?t áp. Hãy c?n th?n v?i các lo?i thu?c. U?ng thu?c charly ? úng li?u ? ??c kê toa. G?i cho bác s? n?u quý v? ngh? mình ?ang có v?n ?? v?i thu?c.  · Trao ??i v?i bác s? tr??c khi quý v? b?t ??u dùng aspirin m?i ngày. Aspirin có th? giúp m?t s? ng??i nh?t ??nh gi?m nguy c? b? ?au jenniffer ho?c ?? t qu? Nadeen Quiet Nh?ng dùng aspirin không phù h?p cho m?i ng??i, vì thu?c có th? gây ra ch?y máu nghiêm tr?ng. · Hãy th??ng xuyên ?i khám bác s? Nadeen Quiet Quý v? có th? c?n ?i khám bác s? th??ng xuyên h?n cho t?i ky mercado?t áp h? michelle?medina.   · N?u abdulkadir v? ?ang dùng thu?c dilip?t áp, hãy trao ??i v?i bác s? tr??c khi quý v? dùng thu?c ch?ng viêm ho?c thông m?i nh? ibuprofen. M?t s? lo?i thu?c này có th? làm t?ng dilip?t áp. · H?c cách ?o dilip?t áp t?i nhà. Thay ??i l?i s?ng  · Felipe trì m?c cân n?ng kh?e m?nh. ?i?u này ? ?c bi?t francois tr?ng n?u quý v? lên cân ? vùng b?ng. Ch? c?n gi?m 10 pound (4,5 kg) cân n?ng c?ng có th? giúp quý v? gi?m dilip?t áp. · T?p th? d?c solange?u h?n n?u bác s? khuy?n ngh? . ?i b? Lionel Banegas ch?n thích h?p. T?ng d?n m?c ?? ?i b? m?i ngày. C? g?ng t?p th? d?c ít nh?t 30 phút vào h?u h?t các ngày kim tu?n. Quý v? c?ng nên b?i, ?i xe ??p ho?c tanya elena các ho?t ??ng khác. · Tránh ho?c h?n ch? u?ng r??u. Andria Corti zan?n v?i bác s? xem quý v? có ???c u?ng r? ?u hay không. · C? g?ng h?n ch? l??ng natri quý v? ?n martinez?ng d??i 2.300 milligram (mg) m?t ngày. Bác s? có th? yêu c?u quý v? c? g?ng ?n ít h?n 1.500 mg m?t ngày. · ?n solange?u trái cây (nh? warren?i và cam), reyna, ??u, ng? c?c nguyên h?t và s?n ph?m làm t? s?a có ít ch?t béo. · Gi?m l??ng ch?t béo bão hòa kim ch? ?? ?n c?a quý v?. Các s?n ph?m có kar?n g?c ?? ng v?t nh? s?a, phó mát và th?t có ch?a ch?t béo bão hòa. Vi?c h?n ch? ?n các lo?i th?c ph?m này có th? giúp quý v? gi?m cân và c?ng gi?m nguy c? b? b?nh jenniffer. · Không hút thu?c. Hút thu?c làm t?ng nguy c? b? ?au jenniffer và ? ?t qu? . N?u quý v? c?n giúp b? thu?c, hãy nói zan?n v?i bác s? c?a quý v? v? các lo?i thu?c và ch? ?ng trình b? thu?c. Nh?ng cách này có th? giúp quý v? d? b? h?n thu?c h?n.  Khi nào quý v? nên g?i tr? giúp? Hãy g?i 911 b?t c? lúc nào quý v? ngh? mình c?n ch?m sóc c?p c?u. ?i?u này có th? có ngh?a là có các tri?u ch?ng khi?n quý v? ngh? dilip?t áp c?a quý v? s? gây ra v?n ?? nghiêm tr?ng v? jenniffer ho?c m? ch máu. Dilip?t áp c?a quý v? có th? rosario h?n 180/110. Ví d?, hãy g?i 911 n?u:  · Quý v? có các tri?u ch?ng ?au jenniffer. Các tri?u ch?ng này có th? nina g?m:  ? ?au ho?c t?c ng?c ho?c c?m giác l? kim ng?c.  ? ?? m? hôi.  ?  Khó th?.  ? Bu?n nôn ho?c ói m?a.  ? ?au, t?c ho?c c?m giác l? kim l?ng, c?, hàm ho?c b?ng trên ho?c kim m?t hay c? alis bên vai ho?c cánh aubrie. ? Choáng váng ho?c y?u ??t ng?t.  ? Emiliana Patten ? ?p nhanh ho?c b?t th??ng.  · Quý v? có các tri?u ch?ng ? ?t qu? . Các tri?u ch?ng này có th? nina g?m:  ? C?m th?y ??t ng?t tê c?ng, ng?a ran, y?u ho?c tê li?t ? m?t, aubrie ho?c chân, ??c bi?t ch? ? m?t bên c? th?.  ? Thay ??i th? giác ? ?t ng?t.  ? ??t nhiên khó nói.  ? ??t nhiên lú l?n ho?c khó hi?u nh?ng câu nói ??n gi?n.  ? ??t nhiên g?p v?n ?? kim vi?c ?i l?i hay gi? th?ng b?ng. ? ?au ??u d? d?i và ??t ng?t, khác v?i các l?n ?au ??u tr??c ?ây. · Quý v? b? ?au l?ng ho?c ?au b?ng d? d?i. ??ng ch? cho ??n khi dilip?t áp c?a quý v? t? gi?m. Tìm s? giúp ? ? ngay l?p t?c.  G?i cho bác s? c?a quý v? ngay ho?c tìm ki?m s? ch?m sóc ngay l?p t?c n?u:  · Dilip?t áp c?a quý v? chavez h?n solange?u so v?i bình th??ng (ch?ng h?n t? 180/110 tr? lên), nh?ng quý v? l?i không có tri?u ch?ng gì. · Quý v? ngh? dilip?t áp chavez ?ang gây ra các tri?u ch?ng nh?:  ? ?au ??u d? d?i.  ? M? m?t. Gamaliel dõi k? h?n các thay ??i v? s?c kh?e và nh? liên l?c v?i bác s? n?u:  · Dilip?t áp c?a quý v? kim ít nh?t 2 l?n ?o ? ?u là t? 140/90 tr? lên. M?c này cho bi?t s? chavez nh?t là t? 140 tr? lên ho?c s? th?p nh?t là t? 90 tr? lên ho?c c? alis. · Quý v? ngh? quý v? có th? b? tác d?ng ph? t? thu?c dilip?t áp c?a mình. · Dilip?t áp c?a quý v? th??ng v?n bình th??ng, nh?ng chavez h?n m?c bình th??ng ít nh?t 2 l?n. Quý v? có th? tìm hi?u thêm ? ?âu? Vào [email or web address] Enter E730 tìm hi?u thêm kim hô?p ti?m kiê?m \"Chavez Dilip?t Áp: H???ng D?n Ch?m Sóc - [ High Blood Pressure: Care Instructions ]. \"  Ewa Edwin hi?u l?c k? t?: Ngày 5 Chris?ng M???i 10, 2017  Phiên b?n n?i dun.8  © 7711-1272 [employer]. H??ng d?n ch?m sóc ? ??c s?a ??i cho phù h?p gamaliel gi?y phép c?a chuyên elena ch?m sóc y t?. N?u quý v? có th??c m??c v? các ?i?u ki?n y t? ho?c h??ng d?n này, mandy vui lòng h?i chuyên elena ch?m so?c y t? Stephany Williamson Tâ?p ?oa?n [employer] kh??c t? m?i ??m b?o ho?c trách solange?m v? vi?c s? d?ng thông tin na?y. Ch? ?? ?n DASH: H???ng D?n Ch?m Sóc - [ DASH Diet: Care Instructions ]  H??ng D?n Ch?m Sóc  Ch? ?? ?n DASH là m?t k? ho?ch ?n u?ng có th? giúp gi?m rogelio?t áp c?a quý v?. DASH là vi?t t?t c?a Ph??ng pháp Ch? ?? ?n ?? Ng?n ch?n Lên t?ng xông. Lên t?ng xông là rosario rogelio?t áp. Ch? ?? ?n DASH t?p yolanda vào vi?c ?n các lo?i th?c ph?m có solange?u canxi, herbie, và magiê. Các ch?t genna d??ng này có th? gi?m rogelio?t áp. Các lo?i th?c ph?m có hàm l??ng rosario nh?t các ch?t genna d??ng này là trái cây, reyna c?, s?n ph?m t? s?a ít béo, qu? h?ch, h?t và reyna ??u. Nh?ng vi?c u?ng th?c ph?m b? sung canxi, herbie, và magiê thay vì ?n các lo?i th?c ph?m có hàm l??ng rosario các ch?t genna d??ng này không có tác d?ng t??ng t?. Ch? ?? ?n DASH c?ng nina g?m ng? c?c nguyên h?t, cá, và elena c?m. Ch? ?? ?n DASH là m?t kim nh?ng thay ??i l?i s?ng mà bác s? có th? khuy?n ngh? ?? gi?m rogelio?t áp rosario c?a quý v?. Bác s? c?ng có th? mu?n quý v? gi?m l??ng natri kim ch? ?? ?n c?a mình. Gi?m l??ng natri kim khi th?c hi?n ch? ?? ?n DASH có th? làm gi?m rogelio?t áp solange?u h?n so v?i khi ch? th?c hi?n ch? ?? ?n DASH. Ch?m sóc charly dõi là m?t ph?n francois tr?ng cho vi?c ?i?u tr? và s? an toàn c?a quý v?. ??m b?o s?p x?p và ??n t?t c? các bu?i h?n và g?i ?i?n cho bác s? n?u quý v? có v?n ?? Maritza Hamman v? c?ng nên bi?t k?t qu? xét benoit?m c?a mình và gi? l?i mihir sách các lo?i thu?c mà quý v? dùng. Quý v? có th? ch?m sóc b?n thân t?i nhà th? nào? Th?c hi?n ch? ?? ?n DASH  · ?n 4 ? ?n 5 kh?u ph?n trái cây m?i ngày. M?t kh?u ph?n là 1 mi?ng trái cây c? v?a, ½ c?c trái cây x?t nh? ho?c ?óng h?p, 1/4 c?c trái cây khô, ho?c 4 ounce (½ c?c) n??c trái cây. ?n trái cây th??ng xuyên h?n n??c trái cây. · ?n 4 ? ?n 5 kh?u ph?n reyna c? m?i ngày.  M?t kh?u ph?n là 1 c?c reyna di?p ho?c reyna lá s?ng, ½ c?c reyna c? x?t nh? ho?c n?u chín, ho?c 4 ounce (½ c?c) n??josse loaiza?. ?supriya loaiza? th??medina eller h?n n??c reyna c?.  · ?n 2 ? ?n 3 kh?u ph?n th?c ph?m t? s?a ít béo và không béo m?i ngày. M?t kh?u ph?n là 8 ounce s?a, 1 c?c s?a skinny, ho?c 1 ½ ounce vickie mát. · ?n 6 ? ?n 8 kh?u ph?n ng? c?c m?i ngày. M?t kh?u ph?n là 1 lát bánh mì, 1 ounce ng? c?c khô, ho?c ½ c?c g?o n?u chín, mì ?ng, hay ng? c?c n?u chín. C? g?ng ch?n các s?n ph?m t? ng? c?c nguyên h?t càng solange?u càng t?t.  · H?n ch? th?t n?c, th?t elena c?m, và cá còn 2 kh?u ph?n m?i ngày. M?t kh?u ph?n ?n là 3 ounce, có kích c? dionicio?ng m?t b? bài.  · ?n 4 ? ?n 5 kh?u ph?n qu? h?ch, h?t, và reyna ??u (??u khô n?u chín, ??u l?ng, và ?? h?t) m?i tu?n. M?t kh?u ph?n là 1/3 c?c qu? h?ch, 2 thìa canh h?t, ho?c ½ c?c ??u n?u chín ho?c ?? .  · H?n ch? m? và d?u còn 2 ? ?n 3 kh?u ph?n m?i ngày. M?t kh?u ph?n là 1 thìa cà phê d?u th?c v?t ho?c 2 thìa canh d?u gi?m xa lát. · H?n ch? ?? ng?t và th?c ph?m thêm ? ? ?ng còn 5 kh?u ph?n ho?c ít h?n m?t tu?n. M?t kh?u ph?n là 1 thìa canh th?ch ho?c m?t, ½ c?c brooks không béo, ho?c 1 c?c n??c joby. · ?n ít h?n 2.300 miligam (mg) natri m?i ngày. N?u quý v? h?n ch? l??ng natri c?a mình còn 1.500 mg m?t ngày, quý v? có th? còn gi?m ? ??c rogelio?t áp thêm n?a. [place] [facility] ? ? thành công  · B?t ??u v?i nh?ng thay ??i nh?. Không c? th?c hi?n nh?ng thay ??i ?áng k? cho ch? ?? ?n c?a quý v? ngay l?p t?c. Quý v? có th? c?m th?y mình ?ang ph?i nh?n nh?ng th?c ph?m ?a thích và nh? v?y kh? n?ng không tuân charly k? ho?ch s? rosario h?n. Th?c hi?n nh?ng thay ??i nh?, và kiên sean?t v?i nh?ng thay ??i này. M?t khi nh?ng thay ??i này tr? thành thói quen, b? sung thêm m?t vài thay ? ?i.  · Hãy th? m?t s? bi?n pháp violetta ? ây:  ? ??t m?c tiêu ?n m?t lo?i trái cây ho?c reyna c? vào m?i b?a chính và b?a ph?. ?i?u này s? làm cho vi?c ?n ?? s? l??ng trái cây và reyna c? ???c khuy?n ngh? m?i ngày d? h?n.  ? Th? ?n s?a skinny r?c trái cây và qu? h?ch cho b?a ph? ho?c làm món tráng mi?ng lành m?nh.  ? Thêm reyna di?p, cà skinny, d?a warren?t daren burton. ? K?t h?p ??  bánh pizza làm s?n v?i vickie mát mozzarella ít béo và r?t solange?u nhân t? reyna c?. Th? dùng cà skinny, bí r?, reyna marizol, robin c?i xanh, cà r?t, súp l?, và hành tây. ? Dùng solange?u lo?i reyna c? ?a d?ng c?t nh? v?i n??c ch?m ít béo làm món ayesha v? thay vì khoai tây chiên m?ng và n??c ch?m.  ? R?c h?t h??ng d??ng ho?c h?t h?nh nhân x?t nh? trên món salad. Ho?c th? thêm qu? óc chó hay h?nh nhân b?m nh? vào reyna c? n?u chín.  ? Th? m?t s? b?a ?n christoph s? d?ng các lo?i ??u và ??. Thêm ??u garbanzo ho?c ??u tây vào xa lát. Làm burritos và tacos v?i ?? u guillen ho?c ??u ?en benoit?n. Quý v? có th? tìm hi?u thêm ? ?âu? Vào [email or web address] Enter [email or web address] tìm hi?u thêm kim hô?p ti?m kiê?m \"Ch? ?? ?n DASH: H???ng D?n Ch?m Sóc - [ DASH Diet: Care Instructions ]. \"  Lynn Caicedo hi?u l?c k? t?: [address] Chris?ng M???i 10, 2017  Phiên b?n n?i dun.8  © 7742-0166 [employer]. H??ng d?n ch?m sóc ? ??c s?a ??i cho phù h?p charly gi?y phép c?a chuyên elena ch?m sóc y t?. N?u quý v? có th??c m??c v? các ?i?u ki?n y t? ho?c h??ng d?n này, mandy vui lòng h?i lore parker ch?m so?c y t?. Tâ?p ?oa?n [employer] kh??c t? m?i ??m b?o ho?c trách solange?m v? vi?c s? d?ng thông tin na?y.

## 2018-11-20 ENCOUNTER — HOSPITAL ENCOUNTER (OUTPATIENT)
Dept: NON INVASIVE DIAGNOSTICS | Age: 63
Discharge: HOME | End: 2018-11-20
Attending: FAMILY MEDICINE
Payer: SUBSIDIZED

## 2018-11-20 ENCOUNTER — HOSPITAL ENCOUNTER (OUTPATIENT)
Dept: NUCLEAR MEDICINE | Age: 63
Discharge: HOME | End: 2018-11-20
Attending: FAMILY MEDICINE
Payer: SUBSIDIZED

## 2018-11-20 DIAGNOSIS — R07.9 LEFT SIDED CHEST PAIN: ICD-10-CM

## 2018-11-20 DIAGNOSIS — R06.09 DYSPNEA ON EXERTION: ICD-10-CM

## 2018-11-20 DIAGNOSIS — R07.9 CHEST PAIN, UNSPECIFIED: ICD-10-CM

## 2018-11-20 LAB
ATTENDING PHYSICIAN, CST07: NORMAL
DIAGNOSIS, 93000: NORMAL
DUKE TM SCORE RESULT, CST14: NORMAL
DUKE TREADMILL SCORE, CST13: NORMAL
ECG INTERP BEFORE EX, CST11: NORMAL
ECG INTERP DURING EX, CST12: NORMAL
FUNCTIONAL CAPACITY, CST17: NORMAL
KNOWN CARDIAC CONDITION, CST08: NORMAL
MAX. DIASTOLIC BP, CST04: 75 MMHG
MAX. HEART RATE, CST05: 151 BPM
MAX. SYSTOLIC BP, CST03: 165 MMHG
OVERALL BP RESPONSE TO EXERCISE, CST16: NORMAL
OVERALL HR RESPONSE TO EXERCISE, CST15: NORMAL
PEAK EX METS, CST10: 4.6 METS
PROTOCOL NAME, CST01: NORMAL
REASON FOR TERMINATION: 86 BPM
TEST INDICATION, CST09: NORMAL
TIME IN EXERCISE PHASE, CST02: NORMAL

## 2018-11-20 PROCEDURE — A9500 TC99M SESTAMIBI: HCPCS

## 2018-11-20 PROCEDURE — 93017 CV STRESS TEST TRACING ONLY: CPT

## 2018-11-22 NOTE — PROGRESS NOTES
Normal stress test   I attempted to call pt on home and mobile, was able to leave gen vm on mobile inviting her to call me back

## 2018-11-29 NOTE — PROGRESS NOTES
I saw and evaluated pt with resident. I reviewed with the resident the patient's medical history and the resident's history and findings on the physical examination. I discussed assessment and plan with the resident and concur with the findings and plan as documented in the resident's note.

## 2019-05-21 ENCOUNTER — HOSPITAL ENCOUNTER (OUTPATIENT)
Dept: LAB | Age: 64
Discharge: HOME | End: 2019-05-21

## 2019-05-21 LAB
ANION GAP SERPL CALC-SCNC: 8 MMOL/L (ref 5–15)
BUN SERPL-MCNC: 9 MG/DL (ref 6–20)
BUN/CREAT SERPL: 15 (ref 12–20)
CALCIUM SERPL-MCNC: 8.6 MG/DL (ref 8.5–10.1)
CHLORIDE SERPL-SCNC: 101 MMOL/L (ref 97–108)
CHOLEST SERPL-MCNC: 176 MG/DL
CO2 SERPL-SCNC: 25 MMOL/L (ref 21–32)
CREAT SERPL-MCNC: 0.59 MG/DL (ref 0.55–1.02)
EST. AVERAGE GLUCOSE BLD GHB EST-MCNC: 137 MG/DL
GLUCOSE SERPL-MCNC: 89 MG/DL (ref 65–100)
HBA1C MFR BLD: 6.4 % (ref 4.2–6.3)
HDLC SERPL-MCNC: 48 MG/DL
HDLC SERPL: 3.7 {RATIO} (ref 0–5)
LDLC SERPL CALC-MCNC: 102.8 MG/DL (ref 0–100)
LIPID PROFILE,FLP: ABNORMAL
POTASSIUM SERPL-SCNC: 3.9 MMOL/L (ref 3.5–5.1)
SODIUM SERPL-SCNC: 134 MMOL/L (ref 136–145)
TRIGL SERPL-MCNC: 126 MG/DL (ref ?–150)
VLDLC SERPL CALC-MCNC: 25.2 MG/DL

## 2019-05-21 PROCEDURE — 80048 BASIC METABOLIC PNL TOTAL CA: CPT

## 2019-05-21 PROCEDURE — 80061 LIPID PANEL: CPT

## 2019-05-21 PROCEDURE — 83036 HEMOGLOBIN GLYCOSYLATED A1C: CPT

## 2020-09-11 ENCOUNTER — HOSPITAL ENCOUNTER (OUTPATIENT)
Dept: MAMMOGRAPHY | Age: 65
Discharge: HOME | End: 2020-09-11
Attending: INTERNAL MEDICINE
Payer: COMMERCIAL

## 2020-09-11 DIAGNOSIS — Z12.31 VISIT FOR SCREENING MAMMOGRAM: ICD-10-CM

## 2020-09-11 PROCEDURE — 77067 SCR MAMMO BI INCL CAD: CPT

## 2020-09-22 ENCOUNTER — HOSPITAL ENCOUNTER (OUTPATIENT)
Dept: MAMMOGRAPHY | Age: 65
Discharge: HOME | End: 2020-09-22
Attending: INTERNAL MEDICINE
Payer: COMMERCIAL

## 2020-09-22 DIAGNOSIS — Z13.820 OSTEOPOROSIS SCREENING: ICD-10-CM

## 2020-09-22 PROCEDURE — 77080 DXA BONE DENSITY AXIAL: CPT

## 2023-05-22 RX ORDER — LISINOPRIL 10 MG/1
1 TABLET ORAL DAILY
COMMUNITY
Start: 2018-11-19

## 2023-05-22 RX ORDER — DICLOFENAC SODIUM 75 MG/1
75 TABLET, DELAYED RELEASE ORAL 2 TIMES DAILY
COMMUNITY
Start: 2018-08-07

## 2023-05-22 RX ORDER — RANITIDINE 150 MG/1
150 TABLET ORAL 2 TIMES DAILY
COMMUNITY
Start: 2018-11-19

## 2023-05-22 RX ORDER — ACETAMINOPHEN AND CODEINE PHOSPHATE 300; 30 MG/1; MG/1
1 TABLET ORAL EVERY 6 HOURS PRN
COMMUNITY
Start: 2018-03-05

## 2023-05-22 RX ORDER — ISOSORBIDE MONONITRATE 30 MG/1
30 TABLET, EXTENDED RELEASE ORAL DAILY
COMMUNITY
Start: 2018-11-19

## 2025-01-28 NOTE — PROGRESS NOTES
ESTABLISHED PATIENT:    S:  Devi Morelos is a 72 year old female with a history of mitral valve prolapse, mitral regurgitation, palpitations, NICM, and PVC's for follow-up on CV issues. Since her last OV she has been doing well.  She has had some palpitations, one episode this past weekend while driving. She has not had any chest pain or shortness of breath.  She feels well on her current dose of medications.  She has not had any leg swelling.  She has not had any chest pain or SOB. Denies dizziness, syncope.  No family history of aortic pathology or mitral valve disease.  No chest pain or shortness of breath.  She is very active.      ROS:  A full 14 point review of systems was completed and is negative except as listed above.    PMH:  Patient Active Problem List   Diagnosis    History of hyperthyroidism    Primary hyperparathyroidism  (CMD)    Postmenopausal osteoporosis    Vitamin D insufficiency    Contusion of right knee    Corn of toe    Fall on same level from slipping, tripping or stumbling    Issue of medical certificate for disability examination    Myopia    Osteoarthritis of right hip    PVC (premature ventricular contraction)    Pain in joint of right knee    Presbyopia    Regular astigmatism    Right hip pain    Tachycardia, unspecified    Palpitations    Nonrheumatic mitral valve regurgitation    Mitral valve prolapse    SVT (supraventricular tachycardia) (CMD)    NICM (nonischemic cardiomyopathy)  (CMD)     MEDS:  Current Outpatient Medications   Medication Sig Dispense Refill    metoPROLOL succinate (TOPROL-XL) 25 MG 24 hr tablet Take 1 tablet by mouth once daily 90 tablet 1    meclizine (ANTIVERT) 12.5 MG tablet Take 1 tablet by mouth 3 times daily as needed for Dizziness or Nausea. 30 tablet 1    rizatriptan (MAXALT-MLT) 10 MG disintegrating tablet Take 1 tablet by mouth daily as needed for Migraine. Dissolve 1 tablet on the tongue at onset of migraine. May repeat after 2 hours if needed. 12  Met with Crescencio Yuan and completed Access Now application.   Sadia Hernandez tablet 1    ondansetron (ZOFRAN ODT) 4 MG disintegrating tablet Place 1 tablet onto the tongue every 8 hours as needed for Nausea. 30 tablet 1    atorvastatin (LIPITOR) 10 MG tablet TAKE 1 TABLET BY MOUTH DAILY 90 tablet 3    GLUCOSAMINE-CHONDROITIN PO       cholecalciferol (VITAMIN D3) 1000 UNITS tablet Take 1,000 Units by mouth daily.       Current Facility-Administered Medications   Medication Dose Route Frequency Provider Last Rate Last Admin    DENOSumab (PROLIA) SubQ injection 60 mg  60 mg Subcutaneous Q6 Months Mirna Wright, DO   60 mg at 10/18/24 0940     SOCIAL HISTORY:  Social History     Tobacco Use    Smoking status: Former     Current packs/day: 0.00     Types: Cigarettes     Quit date:      Years since quittin.0    Smokeless tobacco: Never   Vaping Use    Vaping status: never used   Substance Use Topics    Alcohol use: Yes     Comment: VERY LITTLE    Drug use: Never     FAMILY HISTORY:  Family History   Problem Relation Age of Onset    Osteoarthritis Mother     Asthma Mother         pulmonary stenosis?    Osteoporosis Mother     Cancer, Prostate Father     Heart Father         CHF    COPD Father          91    Cancer, Skin Father     Pacemaker Brother     Cancer, Breast Maternal Aunt 70    Cancer, Breast Maternal Cousin 50    Cancer, Breast Maternal Cousin 62        ALLERGIES:  ALLERGIES:   Allergen Reactions    Amoxicillin RASH    Amoxicillin-Pot Clavulanate RASH     Rash on thigh and itching    Rosuvastatin GI UPSET     Abdominal cramping    Azithromycin RASH     O:  Vitals:    25 0903   BP: 106/74   Pulse: 74   Resp: 16       PHYSICAL EXAM:  GEN: well groomed, NAD, conversant  SKIN: no rashes noted, normal temperature  NECK: neck supple, trachea midline, no thyromegaly  EYES: sclerae anicteric, moist conjunctiva, PERRLA  ENT: oropharynx clear, MMM   CV: RR, 2/6 holosystolic murmur, no extra heart sounds, carotid volumes full, carotid upstroke normal, LV impulse non displaced,  JVP 6cm  LUNGS: clear to auscultation bilaterally, no wheezes/rales/rhonchi, normal respiratory effort, no usage of accessory muscles  ABD: soft, non-tender, non-distended, normal active bowel sounds  EXT: no peripheral edema noted, normal peripheral pulses  PSYCH: A&Ox3, appropriate affect  MUSC: no clubbing or cyanosis noted    I extensively reviewed clinical history, medical records, and various cardiovascular studies with patient. Pertinent testing is listed below.      LABS:  Lab Services on 10/04/2024   Component Date Value Ref Range Status    PTH, Intact 10/04/2024 134 (H)  19 - 88 pg/mL Final    Fasting Status 10/04/2024 12  0 - 999 Hours Final    Sodium 10/04/2024 136  135 - 145 mmol/L Final    Potassium 10/04/2024 4.3  3.4 - 5.1 mmol/L Final    Chloride 10/04/2024 100  97 - 110 mmol/L Final    Carbon Dioxide 10/04/2024 31  21 - 32 mmol/L Final    Anion Gap 10/04/2024 9  7 - 19 mmol/L Final    Glucose 10/04/2024 92  70 - 99 mg/dL Final    BUN 10/04/2024 16  6 - 20 mg/dL Final    Creatinine 10/04/2024 0.89  0.51 - 0.95 mg/dL Final    Glomerular Filtration Rate 10/04/2024 69  >=60 Final    BUN/Cr 10/04/2024 18  7 - 25 Final    Calcium 10/04/2024 10.7 (H)  8.4 - 10.2 mg/dL Final    Bilirubin, Total 10/04/2024 0.9  0.2 - 1.0 mg/dL Final    GOT/AST 10/04/2024 18  <=37 Units/L Final    GPT/ALT 10/04/2024 19  <64 Units/L Final    Alkaline Phosphatase 10/04/2024 46  45 - 117 Units/L Final    Albumin 10/04/2024 4.0  3.4 - 5.0 g/dL Final    Protein, Total 10/04/2024 7.3  6.4 - 8.2 g/dL Final    Globulin 10/04/2024 3.3  2.0 - 4.0 g/dL Final    A/G Ratio 10/04/2024 1.2  1.0 - 2.4 Final     STUDIES:   ECG 11/2019: (Study independently reviewed)  Sinus rhythm with PVC's    TTE 2/2020: (study independently reviewed)  1. Left ventricle: The cavity size is normal. Wall thickness is mildly increased. Systolic function is low normal reduced. The estimated ejection fraction is 50-55%.  2. Mitral valve: Moderate prolapse,  involving the anterior leaflet and the posterior leaflet. Transvalvular velocity is within the normal range.  There is no evidence for stenosis. Mild to moderate regurgitation   directed centrally. The peak diastolic gradient is 5mm Hg. The maximal    regurgitant velocity is 5.08m/sec. The regurgitant velocity-time integral is 79.9cm.    TTE 2/2021: (study independently reviewed)  1. Left ventricle: The cavity size is normal. Wall thickness is normal.     Systolic function is mildly reduced. Doppler parameters are consistent     with abnormal left ventricular relaxation (grade 1 diastolic     dysfunction). The ejection fraction is 45-50%.  2. Regional wall motion abnormalities: There is probable hypokinesis of     the anteroseptal myocardium.  3. Aortic valve: Trivial regurgitation.  4. Mitral valve: The leaflets are moderately thickened. Prolapse,   involving the anterior leaflet and the posterior leaflet. Mild     regurgitation.  5. Left atrium: The atrium is severely dilated.    HOLTER MONITOR 2/2021 (study independently reviewed)  Predominantly sinus rhythm.   Frequent PSVT.  Occasional PVC's (1.42% burden)     PLAN:  Please refer to Dr. Gu as her cardiomyopathy could in part be tachy mediated. Needs EP study.    [facility] 2/2021: (study independently reviewed)  CORONARY ARTERIES:    The coronary circulation is right dominant. The left main  is absent (the LAD and circumflex arising from separate ostia) and bifurcates  normally into the LAD and circumflex. The left anterior descending gives rise to  2 diagonals. The left circumflex gives rise to 2 obtuse marginals. The right coronary gives rise to 1 RV marginal and 1 posterolateral.Left main: No significant obstructive disease, NO LEFT MAIN SEPARATE left  anterior descending coronary AND left circumflex OSTIA  LAD:  Large vessel size, moderately tortuous. Minor luminal irregularities.  1st diagonal:  Normal. Medium vessel size.  2nd diagonal:  Normal. Medium  vessel size.  Left circumflex:  Medium vessel size.  1st obtuse marginal:  Medium vessel size, moderately tortuous.  2nd obtuse marginal:  Medium vessel size, moderately tortuous.  Right posterior descending:  Normal. Large vessel size.  1st right posterolateral:  Normal. Large vessel size.  LEFT VENTRICLE:  The estimated ejection fraction is 40-45%.    TTE 2/2021: (study independently reviewed)  1. Left ventricle: The cavity size is normal. Wall thickness is normal.     Systolic function is mildly reduced. Doppler parameters are consistent     with abnormal left ventricular relaxation (grade 1 diastolic     dysfunction). The ejection fraction is 45-50%.  2. Regional wall motion abnormalities: There is probable hypokinesis of     the anteroseptal myocardium.  3. Aortic valve: Trivial regurgitation.  4. Mitral valve: The leaflets are moderately thickened. Prolapse,     involving the anterior leaflet and the posterior leaflet. Mild     regurgitation.  5. Left atrium: The atrium is severely dilated.    TTE 8/2021: (study independently reviewed)   1. Limited echocardiogram performed with Definity to assess LV function.  2. Left ventricle: The cavity size is normal. Wall thickness is normal.     The ejection fraction was measured by biplane method of disks. The     ejection fraction is 55%.    TTE 2/2024: (study independently reviewed)  1. Left ventricle: The cavity size is mildly dilated. Wall thickness is     normal. Systolic function is normal. The ejection fraction was measured by  biplane method of disks. Left ventricular diastolic function parameters are  normal. The ejection fraction is 55%.  2. Aortic valve: There is mild regurgitation.  3. Mitral valve: Mild prolapse, involving the anterior leaflet and the     posterior leaflet. There is mild regurgitation.  4. Left atrium: The atrium is mildly dilated.  5. Right ventricle: The cavity size is normal. Wall thickness is normal.     Systolic function is normal.  6.  Pulmonary arteries: Systolic pressure is within the normal range, estimated     to be 19mm Hg.    ASSESSMENT/PLAN:    MVP/MR:  - Recent echocardiogram with moderate bileaflet prolapse with mild to moderate regurgitation  - TTE 2/2020 showed low normal EF, normal LV size, but pt has no symptoms of CHF (may have been low due to PVC's)  - stable mild MR on 2/2024 TTE  - check TTE every two years  - Patient is clinically euvolemic, NYHA class I  - No prior history of congestive heart failure  - No prior history of mitral valve prolapse or aortic pathology in her family  - Patient is very tall but both of her parents are very tall and it runs in her family.  - No family history of Marfan syndrome or connective tissue disorder  - continue metoprolol xl 25mg daily     PVC's:  - continue metoprolol xl 25mg daily  - atrial arrhythmia's and PVC's likely in part related to MVP/MR as above  - 24 hour holter showed low PVC burden 2/2021 and 5/2021   - multiple PVC's on exam and on ECG today, will repeat holter monitor  - minimize caffeine intake    NICM  - EF 45% with hypokinesis of the anteroseptal wall on TTE 2/2021  - LHC with normal coronaries 2/2021  - likely tachycardia mediated s/p ablation  - EF 55% on TTE 8/2021  - check TTE now  - continue metoprolol xl   - repeat TTE 8/2021 with normal EF  - given normalization of EF no need to add ACEi/ARB/Arni  - sleep study subclinical GINA --> 5/2021    PAT:  - continue metoprolol xl 25mg daily  - atrial arrhythmia's and PVC's likely in part related to MVP/MR as above  - s/p atrial tachycardia RF ablation 4/14/2021  - repeat holter 5/2021 showed much less PAT, short runs only    Hyperlipidemia  -CV risk score of 10.3% based on prior lipids  -Normal coronaries 2/2021  -CCS 4 on 3/2023 test  -rosuvastatin caused diarrhea   -continue atorvastatin 10mg at bedtime  -LDL at goal     Preventive Cardiology:   - Diet: instructed pt to follow a low salt, low fat   - Exercise: instructed pt  to perform moderate intensity exercise 30 min a day for at least 5 days/week (preferentially 7days/week)  - Tobacco Use: not currently a smoker, encouraged patient to continue to abstain    RTC 1 year    Electronically signed by: Kenyatta Escobedo MD  1/28/2025